# Patient Record
Sex: MALE | Race: OTHER | Employment: FULL TIME | URBAN - METROPOLITAN AREA
[De-identification: names, ages, dates, MRNs, and addresses within clinical notes are randomized per-mention and may not be internally consistent; named-entity substitution may affect disease eponyms.]

---

## 2017-01-06 ENCOUNTER — OFFICE VISIT (OUTPATIENT)
Dept: INTERNAL MEDICINE CLINIC | Facility: CLINIC | Age: 42
End: 2017-01-06

## 2017-01-06 VITALS
SYSTOLIC BLOOD PRESSURE: 140 MMHG | OXYGEN SATURATION: 98 % | WEIGHT: 216 LBS | HEIGHT: 72 IN | BODY MASS INDEX: 29.26 KG/M2 | HEART RATE: 80 BPM | TEMPERATURE: 98 F | DIASTOLIC BLOOD PRESSURE: 90 MMHG

## 2017-01-06 DIAGNOSIS — J01.10 ACUTE NON-RECURRENT FRONTAL SINUSITIS: Primary | ICD-10-CM

## 2017-01-06 DIAGNOSIS — I10 ESSENTIAL HYPERTENSION: ICD-10-CM

## 2017-01-06 DIAGNOSIS — R51.9 NONINTRACTABLE HEADACHE, UNSPECIFIED CHRONICITY PATTERN, UNSPECIFIED HEADACHE TYPE: ICD-10-CM

## 2017-01-06 DIAGNOSIS — E78.1 HYPERTRIGLYCERIDEMIA: ICD-10-CM

## 2017-01-06 PROCEDURE — 99214 OFFICE O/P EST MOD 30 MIN: CPT | Performed by: INTERNAL MEDICINE

## 2017-01-06 PROCEDURE — 99212 OFFICE O/P EST SF 10 MIN: CPT | Performed by: INTERNAL MEDICINE

## 2017-01-06 RX ORDER — CYCLOBENZAPRINE HCL 5 MG
5 TABLET ORAL 3 TIMES DAILY PRN
COMMUNITY
End: 2017-04-07

## 2017-01-06 RX ORDER — FLUTICASONE PROPIONATE 50 MCG
2 SPRAY, SUSPENSION (ML) NASAL DAILY
Qty: 3 BOTTLE | Refills: 3 | Status: SHIPPED | OUTPATIENT
Start: 2017-01-06 | End: 2018-01-01

## 2017-01-06 RX ORDER — AZITHROMYCIN 250 MG/1
TABLET, FILM COATED ORAL
Qty: 6 TABLET | Refills: 0 | Status: SHIPPED | OUTPATIENT
Start: 2017-01-06 | End: 2017-04-07 | Stop reason: ALTCHOICE

## 2017-01-06 NOTE — PROGRESS NOTES
Marce Ramos is a 40year old male. Patient presents with:  Checkup: 1 week f/u for headache, CT head negative, pt still has HA but it is not as bad  Nasal Congestion: Pt states since Monday he has had +nasal congestion--clear mucus, +sore throat, +nonp systemic lupus [Other] [OTHER] Mother       age 37   • Thyroid disease       sister, 2 brothers   • Heart Disease Brother 28     unknown heart disease   • Cancer Paternal Uncle      , lung, smoker   • Diabetes Paternal Aunt    • Cancer Maternal Aun 4. Essential hypertension  BP is a little elevated today but not sure as he is feeling very sick today. Encouraged to check BPs at home and call the office in 2 weeks with his bp readings.      5. Vertigo  Discussed cutting back on salt intake and incre

## 2017-01-25 RX ORDER — LEVOTHYROXINE SODIUM 0.07 MG/1
75 TABLET ORAL
Qty: 90 TABLET | Refills: 1 | Status: SHIPPED | OUTPATIENT
Start: 2017-01-25 | End: 2017-06-06

## 2017-01-25 NOTE — TELEPHONE ENCOUNTER
Called to make sure patient was still taking, we sent in April for #90, should have already received another request. Patient states he is still taking, but he is out of medication. eRx sent to [facility].

## 2017-04-07 ENCOUNTER — TELEPHONE (OUTPATIENT)
Dept: INTERNAL MEDICINE CLINIC | Facility: CLINIC | Age: 42
End: 2017-04-07

## 2017-04-07 NOTE — PROGRESS NOTES
Froylan Cade is a 40year old male. Patient presents with:  Dizziness: Pt states of pressure on both sides of head since Tuesday, dizziness, R eye blurred vision  Anxiety: Pt states he cannot stay at outside places long without having anxiety and then h • Diabetes Brother 27     type 2   • Hypertension Father    • systemic lupus [Other] [OTHER] Mother       age 37   • Thyroid disease       sister, 2 brothers   • Heart Disease Brother 28     unknown heart disease   • Cancer Paternal Uncle      , zyrtec. - Amoxicillin-Pot Clavulanate 875-125 MG Oral Tab; Take 1 tablet by mouth 2 (two) times daily. Dispense: 20 tablet;  Refill: 0      Johny Hollingsworth DO  4/7/2017  4:41 PM

## 2017-04-07 NOTE — TELEPHONE ENCOUNTER
Faxed Xanax refill #60 to [facility]  Pt was given written Rx for #30 in the office to fill at local pharm

## 2017-06-08 RX ORDER — LEVOTHYROXINE SODIUM 0.07 MG/1
TABLET ORAL
Qty: 90 TABLET | Refills: 1 | Status: SHIPPED | OUTPATIENT
Start: 2017-06-08 | End: 2018-04-23

## 2017-06-08 RX ORDER — ICOSAPENT ETHYL 1000 MG/1
CAPSULE ORAL
Qty: 180 CAPSULE | Refills: 1 | Status: SHIPPED | OUTPATIENT
Start: 2017-06-08 | End: 2019-12-16

## 2017-07-23 ENCOUNTER — TELEPHONE (OUTPATIENT)
Dept: INTERNAL MEDICINE CLINIC | Facility: CLINIC | Age: 42
End: 2017-07-23

## 2017-07-23 DIAGNOSIS — E78.1 HYPERTRIGLYCERIDEMIA: Primary | ICD-10-CM

## 2017-07-24 NOTE — TELEPHONE ENCOUNTER
Please notify patient that I reviewed his blood test results. A part of his cholesterol called triglcerides was high. Would recommend that he cut down on carbohydrates (bread, rice, cereal, pasta, desserts).  Also eating fish can help with this (or he can t

## 2017-07-24 NOTE — TELEPHONE ENCOUNTER
FBG gluc 98, uric acid 5.6, Cr 1.08, normal electrolytes, GFR 85, normal LFT, Iron 82, Tchol 156, , HDL 33, LDL 59, Hgb 13.8

## 2018-04-23 DIAGNOSIS — Z00.00 ANNUAL PHYSICAL EXAM: ICD-10-CM

## 2018-04-23 DIAGNOSIS — E03.9 HYPOTHYROIDISM, UNSPECIFIED TYPE: Primary | ICD-10-CM

## 2018-04-23 NOTE — TELEPHONE ENCOUNTER
Era Hutchins is requesting a refill on: Synthroid 75 mcg   Ph. # [phone]   Juan Carlos ph.  # [phone]   Routed to Rx

## 2018-04-27 RX ORDER — LEVOTHYROXINE SODIUM 0.07 MG/1
TABLET ORAL
Qty: 90 TABLET | Refills: 1 | Status: SHIPPED | OUTPATIENT
Start: 2018-04-27 | End: 2020-02-20

## 2018-04-27 NOTE — TELEPHONE ENCOUNTER
Please have patient come in for annual physical, has been >1 yr. Also placed blood work orders for fasting blood test. Try to get these done 1week prior to visit.

## 2018-04-30 NOTE — TELEPHONE ENCOUNTER
To  to call patient to schedule PE, Please relay MDs msg to have fasting blood work done prior to visit. Pt to fast for 12 hrs.

## 2018-06-11 ENCOUNTER — TELEPHONE (OUTPATIENT)
Dept: INTERNAL MEDICINE CLINIC | Facility: CLINIC | Age: 43
End: 2018-06-11

## 2018-06-11 NOTE — TELEPHONE ENCOUNTER
Pt had labs done about 2 weeks ago at [address] [place] [address] is looking for the results.     To Nursing

## 2018-06-11 NOTE — TELEPHONE ENCOUNTER
Called patient spoke with spouse and relayed DR. ROBERT message - also fax number for our office given - verbalized understanding

## 2018-06-11 NOTE — TELEPHONE ENCOUNTER
I did not receive any results--they should call [facility] to make sure it was faxed to the correct doctor.

## 2019-02-17 ENCOUNTER — HOSPITAL ENCOUNTER (EMERGENCY)
Facility: HOSPITAL | Age: 44
Discharge: HOME | End: 2019-02-17
Attending: EMERGENCY MEDICINE
Payer: COMMERCIAL

## 2019-02-17 ENCOUNTER — APPOINTMENT (OUTPATIENT)
Dept: CT IMAGING | Facility: HOSPITAL | Age: 44
End: 2019-02-17
Attending: EMERGENCY MEDICINE
Payer: COMMERCIAL

## 2019-02-17 VITALS
DIASTOLIC BLOOD PRESSURE: 78 MMHG | TEMPERATURE: 98 F | SYSTOLIC BLOOD PRESSURE: 125 MMHG | RESPIRATION RATE: 18 BRPM | BODY MASS INDEX: 28.44 KG/M2 | HEART RATE: 63 BPM | HEIGHT: 72 IN | OXYGEN SATURATION: 95 % | WEIGHT: 210 LBS

## 2019-02-17 DIAGNOSIS — A08.4 VIRAL GASTROENTERITIS: Primary | ICD-10-CM

## 2019-02-17 LAB
ANION GAP SERPL CALC-SCNC: 3 MMOL/L (ref 0–18)
BACTERIA UR QL AUTO: NEGATIVE /HPF
BASOPHILS # BLD AUTO: 0.05 X10(3) UL (ref 0–0.2)
BASOPHILS NFR BLD AUTO: 0.7 %
BILIRUB UR QL: NEGATIVE
BUN BLD-MCNC: 19 MG/DL (ref 7–18)
BUN/CREAT SERPL: 17 (ref 10–20)
CALCIUM BLD-MCNC: 8.8 MG/DL (ref 8.5–10.1)
CHLORIDE SERPL-SCNC: 106 MMOL/L (ref 98–107)
CLARITY UR: CLEAR
CO2 SERPL-SCNC: 29 MMOL/L (ref 21–32)
COLOR UR: YELLOW
CREAT BLD-MCNC: 1.12 MG/DL (ref 0.7–1.3)
DEPRECATED RDW RBC AUTO: 37.9 FL (ref 35.1–46.3)
EOSINOPHIL # BLD AUTO: 0.25 X10(3) UL (ref 0–0.7)
EOSINOPHIL NFR BLD AUTO: 3.3 %
ERYTHROCYTE [DISTWIDTH] IN BLOOD BY AUTOMATED COUNT: 11.4 % (ref 11–15)
GLUCOSE BLD-MCNC: 71 MG/DL (ref 70–99)
GLUCOSE UR-MCNC: NEGATIVE MG/DL
HCT VFR BLD AUTO: 41.7 % (ref 39–53)
HGB BLD-MCNC: 14.8 G/DL (ref 13–17.5)
HGB UR QL STRIP.AUTO: NEGATIVE
IMM GRANULOCYTES # BLD AUTO: 0.02 X10(3) UL (ref 0–1)
IMM GRANULOCYTES NFR BLD: 0.3 %
KETONES UR-MCNC: NEGATIVE MG/DL
LEUKOCYTE ESTERASE UR QL STRIP.AUTO: NEGATIVE
LYMPHOCYTES # BLD AUTO: 2.58 X10(3) UL (ref 1–4)
LYMPHOCYTES NFR BLD AUTO: 34.5 %
MCH RBC QN AUTO: 32.4 PG (ref 26–34)
MCHC RBC AUTO-ENTMCNC: 35.5 G/DL (ref 31–37)
MCV RBC AUTO: 91.2 FL (ref 80–100)
MONOCYTES # BLD AUTO: 0.54 X10(3) UL (ref 0.1–1)
MONOCYTES NFR BLD AUTO: 7.2 %
NEUTROPHILS # BLD AUTO: 4.04 X10 (3) UL (ref 1.5–7.7)
NEUTROPHILS # BLD AUTO: 4.04 X10(3) UL (ref 1.5–7.7)
NEUTROPHILS NFR BLD AUTO: 54 %
NITRITE UR QL STRIP.AUTO: NEGATIVE
OSMOLALITY SERPL CALC.SUM OF ELEC: 287 MOSM/KG (ref 275–295)
PH UR: 5 [PH] (ref 5–8)
PLATELET # BLD AUTO: 322 10(3)UL (ref 150–450)
POTASSIUM SERPL-SCNC: 3.9 MMOL/L (ref 3.5–5.1)
PROT UR-MCNC: NEGATIVE MG/DL
RBC # BLD AUTO: 4.57 X10(6)UL (ref 4.3–5.7)
RBC #/AREA URNS AUTO: 1 /HPF
SODIUM SERPL-SCNC: 138 MMOL/L (ref 136–145)
SP GR UR STRIP: 1.02 (ref 1–1.03)
UROBILINOGEN UR STRIP-ACNC: <2
VIT C UR-MCNC: NEGATIVE MG/DL
WBC # BLD AUTO: 7.5 X10(3) UL (ref 4–11)
WBC #/AREA URNS AUTO: 1 /HPF

## 2019-02-17 PROCEDURE — 85025 COMPLETE CBC W/AUTO DIFF WBC: CPT | Performed by: EMERGENCY MEDICINE

## 2019-02-17 PROCEDURE — 96374 THER/PROPH/DIAG INJ IV PUSH: CPT

## 2019-02-17 PROCEDURE — 96361 HYDRATE IV INFUSION ADD-ON: CPT

## 2019-02-17 PROCEDURE — 80048 BASIC METABOLIC PNL TOTAL CA: CPT | Performed by: EMERGENCY MEDICINE

## 2019-02-17 PROCEDURE — 74177 CT ABD & PELVIS W/CONTRAST: CPT | Performed by: EMERGENCY MEDICINE

## 2019-02-17 PROCEDURE — 81003 URINALYSIS AUTO W/O SCOPE: CPT | Performed by: EMERGENCY MEDICINE

## 2019-02-17 PROCEDURE — 99284 EMERGENCY DEPT VISIT MOD MDM: CPT

## 2019-02-17 RX ORDER — LOPERAMIDE HYDROCHLORIDE 2 MG/1
2 TABLET ORAL 4 TIMES DAILY PRN
Qty: 20 TABLET | Refills: 0 | Status: SHIPPED | OUTPATIENT
Start: 2019-02-17 | End: 2019-03-19

## 2019-02-17 RX ORDER — MORPHINE SULFATE 4 MG/ML
4 INJECTION, SOLUTION INTRAMUSCULAR; INTRAVENOUS ONCE
Status: COMPLETED | OUTPATIENT
Start: 2019-02-17 | End: 2019-02-17

## 2019-02-17 RX ORDER — MORPHINE SULFATE 4 MG/ML
4 INJECTION, SOLUTION INTRAMUSCULAR; INTRAVENOUS ONCE
Status: DISCONTINUED | OUTPATIENT
Start: 2019-02-17 | End: 2019-02-17

## 2019-02-17 NOTE — ED INITIAL ASSESSMENT (HPI)
Pt c/o LLQ pain x3 days, states pain present when having bm's most of the time. Also has pressure when urinating.

## 2019-02-17 NOTE — ED NOTES
Pt states abd pain is now worse than previously. Dr. Shalom Florence notified, order for iv morphine received and carried out.

## 2019-02-17 NOTE — ED PROVIDER NOTES
Patient Seen in: [facility] AND [facility] Emergency Department    History   Patient presents with:  Abdominal Pain    Stated Complaint: abd pain    HPI    46-year-old male with past medical history significant for high cholesterol, hypothyroidism, presents to t Mouth/Throat: MMM, post OP clear with no exudates  Eyes: Conjunctivae and EOM are normal. PERRLA  Neck: Normal range of motion. Neck supple. No tracheal deviation present.    CV: s1s2+, RRR, no m/r/g, normal distal pulses  Pulmonary/Chest: CTA b/l with no 6. Status post cholecystectomy. Mild extrahepatic biliary dilatation is within expectations for the post cholecystectomy state. 7. Uncomplicated duodenal diverticulosis. 8. Lesser incidental findings as above.    Dictated by (CST): Alida Quintero MD on

## 2019-02-19 ENCOUNTER — TELEPHONE (OUTPATIENT)
Dept: GASTROENTEROLOGY | Facility: CLINIC | Age: 44
End: 2019-02-19

## 2019-02-19 NOTE — TELEPHONE ENCOUNTER
Pt contacted and he accepted the following appt, directions provided to the [facility] MARIO, and told to arrive 15 mins earlier:  Future Appointments   Date Time Provider Yola Doty   2/27/2019  1:30 PM Radha August, DAVID ECWMOGASTRO [address] [facility]

## 2019-02-19 NOTE — TELEPHONE ENCOUNTER
New pt requesting appt with a GI physician in two days for ER follow up.  Please call thank you [phone]

## 2019-05-09 ENCOUNTER — OFFICE VISIT (OUTPATIENT)
Dept: GASTROENTEROLOGY | Facility: CLINIC | Age: 44
End: 2019-05-09
Payer: COMMERCIAL

## 2019-05-09 VITALS
SYSTOLIC BLOOD PRESSURE: 143 MMHG | WEIGHT: 217.19 LBS | HEART RATE: 64 BPM | HEIGHT: 72 IN | DIASTOLIC BLOOD PRESSURE: 91 MMHG | BODY MASS INDEX: 29.42 KG/M2

## 2019-05-09 DIAGNOSIS — R19.5 LOOSE STOOLS: ICD-10-CM

## 2019-05-09 DIAGNOSIS — R19.4 CHANGE IN BOWEL HABITS: ICD-10-CM

## 2019-05-09 DIAGNOSIS — R10.32 LLQ ABDOMINAL PAIN: Primary | ICD-10-CM

## 2019-05-09 DIAGNOSIS — R93.5 ABNORMAL CT SCAN, PELVIS: ICD-10-CM

## 2019-05-09 DIAGNOSIS — R93.5 ABNORMAL CT OF THE ABDOMEN: ICD-10-CM

## 2019-05-09 PROCEDURE — 99204 OFFICE O/P NEW MOD 45 MIN: CPT | Performed by: INTERNAL MEDICINE

## 2019-05-09 RX ORDER — DICYCLOMINE HYDROCHLORIDE 10 MG/1
10 CAPSULE ORAL 2 TIMES DAILY PRN
Qty: 60 CAPSULE | Refills: 0 | Status: SHIPPED | OUTPATIENT
Start: 2019-05-09 | End: 2019-07-23

## 2019-05-09 NOTE — PATIENT INSTRUCTIONS
1. Schedule colonoscopy with monitored anesthesia care (MAC) with Dr. Valentin Leong    2.  bowel prep from pharmacy (split Suprep )    3. Continue all medications for procedure    4. Read all bowel prep instructions carefully    5.  AVOID seeds, nuts, po

## 2019-05-09 NOTE — H&P
[facility] - Gastroenterology                                                                                                  Clinic History and Physical unknown heart disease   • Cancer Paternal Uncle         , lung, smoker   • Diabetes Paternal Aunt    • Cancer Maternal Aunt         breast, older age      Social History: Social History    Tobacco Use      Smoking status: Never Smoker      Smokeless to year old male with history of BMI 28, cholecystectomy 2006, hypothyroidism, GERD, hypertriglyceridemia, vertigo here regarding change in bowel habits and LLQ abdominal discomfort    Was in the ED in February for 3 days of abdominal cramping and diarrhea.  A

## 2019-05-10 ENCOUNTER — TELEPHONE (OUTPATIENT)
Dept: GASTROENTEROLOGY | Facility: CLINIC | Age: 44
End: 2019-05-10

## 2019-05-10 DIAGNOSIS — R10.32 LLQ ABDOMINAL PAIN: Primary | ICD-10-CM

## 2019-05-10 DIAGNOSIS — R19.4 CHANGE IN BOWEL HABITS: ICD-10-CM

## 2019-05-10 DIAGNOSIS — R19.5 LOOSE STOOLS: ICD-10-CM

## 2019-05-10 NOTE — TELEPHONE ENCOUNTER
Scheduled for:  Colonoscopy 99948  Provider Name:   Date:  6/5/19  Location:  [facility]  Sedation:  MAC  Time:  8am, pt told willl get call from [facility] for arrival time  Prep:suprep  Meds/Allergies Reconciled?:  maximus reviewed  Diagnosis with codes:  NICK a

## 2019-05-10 NOTE — TELEPHONE ENCOUNTER
PA for CLN initiated via [employer]. Clinicals faxed to [phone], fax confirmation received 5/10/19 @ [phone]. Case number:[id number].    -Awaiting insurance decision at this time.

## 2019-05-10 NOTE — TELEPHONE ENCOUNTER
GI RNs,    Pt has labor fund and is scheduled for colonoscopy on 6/5 at [facility] with  will pt need a PA?  Thank you

## 2019-05-14 ENCOUNTER — TELEPHONE (OUTPATIENT)
Dept: GASTROENTEROLOGY | Facility: CLINIC | Age: 44
End: 2019-05-14

## 2019-05-14 NOTE — TELEPHONE ENCOUNTER
Pts wife/Rani calling for pt cancelling CLN pito 6/5/19, requesting to debra for the date of 6/24/19, informed about the 72 hr cb, pls call at:[phone],thanks.

## 2019-05-20 NOTE — TELEPHONE ENCOUNTER
MELLISSA for [facility] at [phone] for PA for CLN updated. Case number:[id number].     -Awaiting insurance decision at this time.

## 2019-05-21 NOTE — TELEPHONE ENCOUNTER
Spoke to Pari Randolph from [facility] ([phone]) for PA update, she states the case has been approved as clinicals were received for DOS 6/5/2019 with approval number:[id number].

## 2019-05-21 NOTE — TELEPHONE ENCOUNTER
Spoke to Miranda Montiel from [facility] ([phone]) for PA for CLN updated. Case number:[id number]. She states they are awaiting clinicals. I reviewed they were already faxed on [email or web address]. She provided alternate fax of .  Clinicals faxed AGAIN to both

## 2019-05-22 NOTE — TELEPHONE ENCOUNTER
Spoke with pt's wife, she will CB once she speaks with him & they find a date that works for them to reschedule to. Please transfer to Arlette Pham at ext [phone] or [phone] for scheduling. Or please transfer to [facility] in GI if unavailable.

## 2019-05-29 NOTE — TELEPHONE ENCOUNTER
Scheduled for:  Colonoscopy 16713  Provider Name: Dr. Lucila Mann  Date:    From-6/5/19  To-7/17/19  Location:  [facility]  Sedation:  MAC  Time:    From-0800  To-0830 (pt is aware that [facility] will call the day before to confirm arrival time)    Prep:  Suprep, mailed

## 2019-07-15 ENCOUNTER — TELEPHONE (OUTPATIENT)
Dept: GASTROENTEROLOGY | Facility: CLINIC | Age: 44
End: 2019-07-15

## 2019-07-15 NOTE — TELEPHONE ENCOUNTER
Pts wife Joycelyn Chau called to cancel 7/17/19 colonoscopy. Pt will call back at later date to reschedule.

## 2019-07-15 NOTE — TELEPHONE ENCOUNTER
Cancelled for:  Colonoscopy 05302  Provider Name: Dr. Galdino Gtz  Date:    7/17/19  Location:  [facility]  Sedation:  MAC  Time:   0830   Prep:  Suprep  Meds/Allergies Reconciled?:  Physician reviewed   Diagnosis with codes:  LLQ abdominal pain R10.32, Change in b

## 2019-07-23 ENCOUNTER — OFFICE VISIT (OUTPATIENT)
Dept: FAMILY MEDICINE CLINIC | Facility: CLINIC | Age: 44
End: 2019-07-23
Payer: COMMERCIAL

## 2019-07-23 VITALS
WEIGHT: 216.63 LBS | HEIGHT: 72 IN | DIASTOLIC BLOOD PRESSURE: 78 MMHG | HEART RATE: 63 BPM | OXYGEN SATURATION: 98 % | SYSTOLIC BLOOD PRESSURE: 128 MMHG | TEMPERATURE: 98 F | BODY MASS INDEX: 29.34 KG/M2

## 2019-07-23 DIAGNOSIS — H60.12 CELLULITIS OF ANTIHELIX OF LEFT EAR: Primary | ICD-10-CM

## 2019-07-23 PROCEDURE — 99202 OFFICE O/P NEW SF 15 MIN: CPT | Performed by: PHYSICIAN ASSISTANT

## 2019-07-23 PROCEDURE — 87205 SMEAR GRAM STAIN: CPT | Performed by: PHYSICIAN ASSISTANT

## 2019-07-23 PROCEDURE — 87070 CULTURE OTHR SPECIMN AEROBIC: CPT | Performed by: PHYSICIAN ASSISTANT

## 2019-07-23 PROCEDURE — 87077 CULTURE AEROBIC IDENTIFY: CPT | Performed by: PHYSICIAN ASSISTANT

## 2019-07-23 PROCEDURE — 87186 SC STD MICRODIL/AGAR DIL: CPT | Performed by: PHYSICIAN ASSISTANT

## 2019-07-23 RX ORDER — SULFAMETHOXAZOLE AND TRIMETHOPRIM 800; 160 MG/1; MG/1
1 TABLET ORAL 2 TIMES DAILY
Qty: 14 TABLET | Refills: 0 | Status: SHIPPED | OUTPATIENT
Start: 2019-07-23 | End: 2019-07-30

## 2019-07-23 NOTE — PATIENT INSTRUCTIONS
· Use topical medication as prescribed  · Finish all oral antibiotics to prevent resistance  · Apply warm compress to infection site 3 times daily for 15 minutes as a time.   Be sure to use a clean wash rag each time  · Do not try to drain or \"pop\" lesion touches your skin should also wash his or her hands. Don't share towels. Follow-up care  Follow up with your healthcare provider, or as advised.  If your infection does not go away on the first antibiotic, your healthcare provider will prescribe a differen

## 2019-07-23 NOTE — PROGRESS NOTES
CHIEF COMPLAINT:   Patient presents with:  Ear Problem: L ear X 3 days, itchy, swollen, pain is radiating into jaw 6/10. headache      HPI:   Rowland Degree is a 38year old male who presents to clinic today with complaints of outer L ear pain.  Has had apparent distress  SKIN: no rashes,no suspicious lesions  HEAD: atraumatic, normocephalic  EYES: conjunctiva clear, EOM intact  EARS: Left tragus and auricle tender with manipulation . L antihelix edematous, erythematous extending to gisele and helix katja. topically 3 (three) times daily for 7 days. For 7 days         Risk and benefits of medication discussed. If antibiotics prescribed, stressed importance of completing full course of antibiotic. Acetaminophen or NSAID prn pain.       Follow up with PCP with cellulitis.   · If the infection is on your leg, keep your leg raised while sitting. This will help to reduce swelling. · Take all of the antibiotic medicine exactly as directed until it is gone.  Do not miss any doses, especially during the first 7 d

## 2019-07-26 ENCOUNTER — TELEPHONE (OUTPATIENT)
Dept: FAMILY MEDICINE CLINIC | Facility: CLINIC | Age: 44
End: 2019-07-26

## 2019-07-26 ENCOUNTER — HOSPITAL ENCOUNTER (OUTPATIENT)
Age: 44
Discharge: HOME | End: 2019-07-26
Attending: EMERGENCY MEDICINE
Payer: COMMERCIAL

## 2019-07-26 VITALS
WEIGHT: 217 LBS | SYSTOLIC BLOOD PRESSURE: 129 MMHG | HEART RATE: 76 BPM | HEIGHT: 72 IN | TEMPERATURE: 98 F | BODY MASS INDEX: 29.39 KG/M2 | OXYGEN SATURATION: 97 % | DIASTOLIC BLOOD PRESSURE: 85 MMHG | RESPIRATION RATE: 18 BRPM

## 2019-07-26 DIAGNOSIS — H60.12 CELLULITIS OF LEFT EXTERNAL EAR: Primary | ICD-10-CM

## 2019-07-26 PROCEDURE — 99214 OFFICE O/P EST MOD 30 MIN: CPT

## 2019-07-26 PROCEDURE — 99213 OFFICE O/P EST LOW 20 MIN: CPT

## 2019-07-26 RX ORDER — CLINDAMYCIN HYDROCHLORIDE 300 MG/1
300 CAPSULE ORAL 3 TIMES DAILY
Qty: 30 CAPSULE | Refills: 0 | Status: SHIPPED | OUTPATIENT
Start: 2019-07-26 | End: 2019-08-05

## 2019-07-26 NOTE — TELEPHONE ENCOUNTER
PT reports ear is worsening and currently on bactrim and bactroban. Pt reports pus now visible. Asked patient to follow up at our [address] today for further evaluation. Pt reported will leave from [address] within 30mins to go to the [address] [place] [address].  Culture is stil

## 2019-07-26 NOTE — ED INITIAL ASSESSMENT (HPI)
L earache since Sunday. Was seen at the walk in clinic on Monday and put on bactrim and given mupirocin ointment. Pt feels like sx have gotten worse.

## 2019-07-26 NOTE — ED PROVIDER NOTES
Patient Seen in: [facility] AND CLINICS Immediate Care In [address] [place] [address]    History   Patient presents with:  Ear Problem Pain (neurosensory)    Stated Complaint: left ear pain/pressure    HPI    Patient with  URI symptoms for 3-4 days,no  fever, runny nose and lef reviewed and is not pertinent to presenting problem. Social History    Tobacco Use      Smoking status: Never Smoker      Smokeless tobacco: Never Used    Alcohol use:  Yes      Alcohol/week: 1.0 standard drinks      Types: 1 Standard drinks or equivalent HCl 300 MG Oral Cap  Take 1 capsule (300 mg total) by mouth 3 (three) times daily for 10 days.   Qty: 30 capsule Refills: 0

## 2019-07-27 ENCOUNTER — TELEPHONE (OUTPATIENT)
Dept: FAMILY MEDICINE CLINIC | Facility: CLINIC | Age: 44
End: 2019-07-27

## 2019-07-27 NOTE — TELEPHONE ENCOUNTER
TC to pt-informed him of lab results. He was recently seen in [address] [place] [address] for lack of improvement for L ear cellulitis and started on clindamycin and bactrim was discontinued. Discussed with pt likely resistance to clindamycin based on susceptibility report.  He rep

## 2019-09-02 ENCOUNTER — TELEPHONE (OUTPATIENT)
Dept: INTERNAL MEDICINE CLINIC | Facility: CLINIC | Age: 44
End: 2019-09-02

## 2019-09-02 NOTE — TELEPHONE ENCOUNTER
Please notify patient that I received his lab results; but have not seen him for a physical in a while-would recommend that we do this also.      In terms of his lab results-fasting sugar, kidney function, liver function, blood counts were all normal. His t

## 2019-09-03 NOTE — TELEPHONE ENCOUNTER
Spoke to pt's spouse regarding MD message and lab results (ok per hippa). Spouse verbalized understanding and reports she will have pt call back to schedule OV as she does not know his schedule.

## 2019-11-06 ENCOUNTER — TELEPHONE (OUTPATIENT)
Dept: GASTROENTEROLOGY | Facility: CLINIC | Age: 44
End: 2019-11-06

## 2019-11-06 DIAGNOSIS — R19.4 CHANGE IN BOWEL HABITS: ICD-10-CM

## 2019-11-06 DIAGNOSIS — R19.5 LOOSE STOOLS: ICD-10-CM

## 2019-11-06 DIAGNOSIS — R10.32 LLQ ABDOMINAL PAIN: Primary | ICD-10-CM

## 2019-11-06 NOTE — TELEPHONE ENCOUNTER
Wife (on HIPAA) states patient was scheduled for a colonoscopy on 07/17/19 but had to cancel. Patient is currently experiencing  abdominal cramping and diarrhea (4 to 5 times daily) for 2 days. Feeling better today.   Denies abdominal pain,nausea,vomiting,

## 2019-11-06 NOTE — TELEPHONE ENCOUNTER
Ok to schedule    Will need suprep    Thanks    Nikky Solomon MD  [facility], [facility] - Gastroenterology  11/6/2019  1:46 PM

## 2019-11-06 NOTE — TELEPHONE ENCOUNTER
I spoke with patient's wife and message from Dr. Bibi Dow given. Please assist patient in rescheduling his colonoscopy. See message below from Dr. Bibi Dow. Thank you.

## 2019-11-06 NOTE — TELEPHONE ENCOUNTER
Wife states pt had diarrhea and stomach pains three days ago. Wife states pt is feeling better and inquiring if CLN can be scheduled at this time.  Please call [phone]

## 2019-11-13 NOTE — TELEPHONE ENCOUNTER
Scheduled for:  Colonoscopy 25205  Provider Name: Dr. Valentin Leong  Date:    11/25/19  [facility]  GONZALEZ:   1820 (pt is aware to arrive at 0700)    Prep:  Suprep, mailed 11/14/19   Meds/Allergies Reconciled?:  Physician reviewed   Diagnosis

## 2019-11-25 ENCOUNTER — HOSPITAL ENCOUNTER (OUTPATIENT)
Facility: HOSPITAL | Age: 44
Setting detail: HOSPITAL OUTPATIENT SURGERY
Discharge: HOME | End: 2019-11-25
Attending: INTERNAL MEDICINE | Admitting: INTERNAL MEDICINE
Payer: COMMERCIAL

## 2019-11-25 ENCOUNTER — ANESTHESIA (OUTPATIENT)
Dept: ENDOSCOPY | Facility: HOSPITAL | Age: 44
End: 2019-11-25
Payer: COMMERCIAL

## 2019-11-25 ENCOUNTER — ANESTHESIA EVENT (OUTPATIENT)
Dept: ENDOSCOPY | Facility: HOSPITAL | Age: 44
End: 2019-11-25
Payer: COMMERCIAL

## 2019-11-25 VITALS
RESPIRATION RATE: 15 BRPM | WEIGHT: 212 LBS | BODY MASS INDEX: 28.1 KG/M2 | OXYGEN SATURATION: 95 % | HEART RATE: 62 BPM | DIASTOLIC BLOOD PRESSURE: 70 MMHG | HEIGHT: 73 IN | SYSTOLIC BLOOD PRESSURE: 113 MMHG

## 2019-11-25 DIAGNOSIS — R19.5 LOOSE STOOLS: ICD-10-CM

## 2019-11-25 DIAGNOSIS — R19.4 CHANGE IN BOWEL HABITS: ICD-10-CM

## 2019-11-25 DIAGNOSIS — R10.32 LLQ ABDOMINAL PAIN: ICD-10-CM

## 2019-11-25 PROCEDURE — 45380 COLONOSCOPY AND BIOPSY: CPT | Performed by: INTERNAL MEDICINE

## 2019-11-25 PROCEDURE — 0DBE8ZX EXCISION OF LARGE INTESTINE, VIA NATURAL OR ARTIFICIAL OPENING ENDOSCOPIC, DIAGNOSTIC: ICD-10-PCS | Performed by: INTERNAL MEDICINE

## 2019-11-25 RX ORDER — SODIUM CHLORIDE, SODIUM LACTATE, POTASSIUM CHLORIDE, CALCIUM CHLORIDE 600; 310; 30; 20 MG/100ML; MG/100ML; MG/100ML; MG/100ML
INJECTION, SOLUTION INTRAVENOUS CONTINUOUS
Status: DISCONTINUED | OUTPATIENT
Start: 2019-11-25 | End: 2019-11-25

## 2019-11-25 RX ORDER — LIDOCAINE HYDROCHLORIDE 10 MG/ML
INJECTION, SOLUTION EPIDURAL; INFILTRATION; INTRACAUDAL; PERINEURAL AS NEEDED
Status: DISCONTINUED | OUTPATIENT
Start: 2019-11-25 | End: 2019-11-25 | Stop reason: SURG

## 2019-11-25 RX ORDER — MIDAZOLAM HYDROCHLORIDE 1 MG/ML
INJECTION INTRAMUSCULAR; INTRAVENOUS AS NEEDED
Status: DISCONTINUED | OUTPATIENT
Start: 2019-11-25 | End: 2019-11-25 | Stop reason: SURG

## 2019-11-25 RX ORDER — NALOXONE HYDROCHLORIDE 0.4 MG/ML
80 INJECTION, SOLUTION INTRAMUSCULAR; INTRAVENOUS; SUBCUTANEOUS AS NEEDED
Status: DISCONTINUED | OUTPATIENT
Start: 2019-11-25 | End: 2019-11-25

## 2019-11-25 RX ADMIN — SODIUM CHLORIDE, SODIUM LACTATE, POTASSIUM CHLORIDE, CALCIUM CHLORIDE: 600; 310; 30; 20 INJECTION, SOLUTION INTRAVENOUS at 07:59:00

## 2019-11-25 RX ADMIN — MIDAZOLAM HYDROCHLORIDE 2 MG: 1 INJECTION INTRAMUSCULAR; INTRAVENOUS at 08:01:00

## 2019-11-25 RX ADMIN — LIDOCAINE HYDROCHLORIDE 20 MG: 10 INJECTION, SOLUTION EPIDURAL; INFILTRATION; INTRACAUDAL; PERINEURAL at 08:01:00

## 2019-11-25 RX ADMIN — SODIUM CHLORIDE, SODIUM LACTATE, POTASSIUM CHLORIDE, CALCIUM CHLORIDE: 600; 310; 30; 20 INJECTION, SOLUTION INTRAVENOUS at 08:20:00

## 2019-11-25 NOTE — ANESTHESIA PREPROCEDURE EVALUATION
Anesthesia PreOp Note    HPI:     Madi Kidd is a 38year old male who presents for preoperative consultation requested by: Willis Ontiveros MD    Date of Surgery: 11/25/2019    Procedure(s):  COLONOSCOPY  Indication: LLQ abdominal pain, Change in • Thyroid disease Other         sister, 2 brothers   • Heart Disease Brother 28        unknown heart disease   • Cancer Paternal Uncle         , lung, smoker   • Diabetes Paternal Aunt    • Cancer Maternal Aunt         breast, older age     Social [address] [facility] [place] Stress Concern: Not Asked        Weight Concern: Not Asked        Special Diet: Not Asked        Back Care: Not Asked        Exercise: Not Asked        Bike Helmet: Not Asked        Seat Belt: Not Asked        Self-Exams: Not Asked    Social History

## 2019-11-25 NOTE — OPERATIVE REPORT
Colonoscopy Report    Bernardo Fairbanks     1975 Age 38year old   PCP Warner Conner MD Endoscopist Elizabeth Menon MD     Date of procedure: 19    Procedure: Colonoscopy w/ biopsy    Pre-operative diagnosis: LLQ abdominal pain, change in complications. The patient’s vital signs were monitored throughout the procedure and remained stable.     Estimated blood loss: insignificant    Specimens collected:  Colon polyp    Complications: none     Colonoscopy findings:  Terminal ileum: normal mucos

## 2019-11-25 NOTE — H&P
History & Physical Examination    Patient Name: [place] Degree  MRN: [id number]  [facility]: [id number]  YOB: 1975    Diagnosis: change in bowel habits, LLQ abdominal discomfort    Levothyroxine Sodium 75 MCG Oral Tab, Take 1 tablet by mouth  before of the procedure with the patient/family. They understand and agree to proceed with plan of care. I have reviewed the History and Physical done within the last 30 days. Any changes noted above.   Charity Chen MD  [facility], [facility] - Gastroenterology

## 2019-11-25 NOTE — ANESTHESIA POSTPROCEDURE EVALUATION
Patient: Ave Solomon    Procedure Summary     Date:  11/25/19 Room / Location:  [address] [place] [address] ENDOSCOPY 01 / [address] [place] [address] ENDOSCOPY    Anesthesia Start:  2788 Anesthesia Stop:  5364    Procedure:  COLONOSCOPY (N/A ) Diagnosis:       LLQ abdominal pain      Change in rey

## 2019-11-27 ENCOUNTER — TELEPHONE (OUTPATIENT)
Dept: GASTROENTEROLOGY | Facility: CLINIC | Age: 44
End: 2019-11-27

## 2019-11-27 NOTE — TELEPHONE ENCOUNTER
I mailed out colonoscopy results letter to pt  Updated health maintenance  Entered into 5 yr CLN recall  Recall colon in 5 years per.  Colon done 11/25/19    GI staff: please place recall for colonoscopy in 5 years

## 2019-12-16 ENCOUNTER — OFFICE VISIT (OUTPATIENT)
Dept: INTERNAL MEDICINE CLINIC | Facility: CLINIC | Age: 44
End: 2019-12-16
Payer: COMMERCIAL

## 2019-12-16 VITALS
WEIGHT: 209 LBS | TEMPERATURE: 98 F | BODY MASS INDEX: 27.7 KG/M2 | OXYGEN SATURATION: 98 % | HEIGHT: 73 IN | DIASTOLIC BLOOD PRESSURE: 78 MMHG | RESPIRATION RATE: 16 BRPM | HEART RATE: 74 BPM | SYSTOLIC BLOOD PRESSURE: 126 MMHG

## 2019-12-16 DIAGNOSIS — M25.512 ACUTE PAIN OF BOTH SHOULDERS: ICD-10-CM

## 2019-12-16 DIAGNOSIS — E03.9 HYPOTHYROIDISM, UNSPECIFIED TYPE: ICD-10-CM

## 2019-12-16 DIAGNOSIS — M25.511 ACUTE PAIN OF BOTH SHOULDERS: ICD-10-CM

## 2019-12-16 DIAGNOSIS — Z00.00 ANNUAL PHYSICAL EXAM: Primary | ICD-10-CM

## 2019-12-16 DIAGNOSIS — E78.1 HYPERTRIGLYCERIDEMIA: ICD-10-CM

## 2019-12-16 DIAGNOSIS — K63.5 POLYP OF COLON, UNSPECIFIED PART OF COLON, UNSPECIFIED TYPE: ICD-10-CM

## 2019-12-16 DIAGNOSIS — F41.9 ANXIETY: ICD-10-CM

## 2019-12-16 PROCEDURE — 99396 PREV VISIT EST AGE 40-64: CPT | Performed by: INTERNAL MEDICINE

## 2019-12-16 RX ORDER — MELOXICAM 7.5 MG/1
7.5 TABLET ORAL DAILY
Qty: 30 TABLET | Refills: 1 | Status: SHIPPED | OUTPATIENT
Start: 2019-12-16 | End: 2020-10-28 | Stop reason: ALTCHOICE

## 2019-12-16 RX ORDER — CLONAZEPAM 0.5 MG/1
TABLET ORAL
Refills: 0 | COMMUNITY
Start: 2019-07-30 | End: 2019-12-16

## 2019-12-16 RX ORDER — ROSUVASTATIN CALCIUM 10 MG/1
TABLET, COATED ORAL
Refills: 1 | COMMUNITY
Start: 2019-10-29 | End: 2020-04-20

## 2019-12-16 RX ORDER — BROMPHENIRAMINE MALEATE, PSEUDOEPHEDRINE HYDROCHLORIDE, AND DEXTROMETHORPHAN HYDROBROMIDE 2; 30; 10 MG/5ML; MG/5ML; MG/5ML
SYRUP ORAL
Refills: 0 | COMMUNITY
Start: 2019-09-10 | End: 2019-12-16

## 2019-12-16 RX ORDER — ALPRAZOLAM 0.25 MG/1
0.25 TABLET ORAL DAILY PRN
Qty: 30 TABLET | Refills: 0 | Status: SHIPPED | OUTPATIENT
Start: 2019-12-16 | End: 2020-04-19

## 2019-12-16 RX ORDER — ICOSAPENT ETHYL 1000 MG/1
4 CAPSULE ORAL DAILY
Qty: 360 CAPSULE | Refills: 1 | Status: SHIPPED | OUTPATIENT
Start: 2019-12-16 | End: 2020-03-15

## 2019-12-16 NOTE — PROGRESS NOTES
Jay Cohen is a 38year old malePatient presents with:  Neck Pain: Patient c/o left neck and shoulder pain for past 1 week. Pain 8/10 to left neck and shoulder. Lab Results: Patient present to discuss lab work that were faxed a few months ago. Surgical History:   Procedure Laterality Date   • CHOLECYSTECTOMY  2006   • COLONOSCOPY N/A 11/25/2019    Performed by Deejay Babin MD at [address]  around 2004 per patient   • REMOVAL GALLBLADDER        F b/l    ASSESSMENT AND PLAN:     1. dyslipidemia  Triglycerides >400; pt was taking vascepa 2g daily; will increase to 4g daily. Given written orders for labs in March 2020.      2. Hypothyroidism, unspecified type  Synthroid 75mcg daily; check TSH (given wr

## 2019-12-17 ENCOUNTER — TELEPHONE (OUTPATIENT)
Dept: INTERNAL MEDICINE CLINIC | Facility: CLINIC | Age: 44
End: 2019-12-17

## 2019-12-17 NOTE — TELEPHONE ENCOUNTER
Galindo Soria is requesting clarification of directions for Vascepa,  Taking 4 ts po once daily? ?  Placed in purple folder

## 2019-12-18 ENCOUNTER — PATIENT MESSAGE (OUTPATIENT)
Dept: INTERNAL MEDICINE CLINIC | Facility: CLINIC | Age: 44
End: 2019-12-18

## 2019-12-19 NOTE — TELEPHONE ENCOUNTER
From: Bernardo Fairbanks  To: Kathleen Finley DO  Sent: 12/18/2019 5:32 PM CST  Subject: Prescription Question    How many a day should I take? the vascepa was too expensive.

## 2019-12-27 ENCOUNTER — LAB ENCOUNTER (OUTPATIENT)
Dept: LAB | Age: 44
End: 2019-12-27
Attending: INTERNAL MEDICINE
Payer: COMMERCIAL

## 2019-12-27 DIAGNOSIS — Z12.5 PROSTATE CANCER SCREENING: ICD-10-CM

## 2019-12-27 DIAGNOSIS — E03.9 HYPOTHYROIDISM: Primary | ICD-10-CM

## 2019-12-27 PROCEDURE — 84443 ASSAY THYROID STIM HORMONE: CPT

## 2019-12-27 PROCEDURE — 36415 COLL VENOUS BLD VENIPUNCTURE: CPT

## 2019-12-27 PROCEDURE — 84439 ASSAY OF FREE THYROXINE: CPT

## 2020-01-01 ENCOUNTER — PATIENT MESSAGE (OUTPATIENT)
Dept: INTERNAL MEDICINE CLINIC | Facility: CLINIC | Age: 45
End: 2020-01-01

## 2020-01-01 DIAGNOSIS — E03.9 HYPOTHYROIDISM, UNSPECIFIED TYPE: Primary | ICD-10-CM

## 2020-01-02 NOTE — TELEPHONE ENCOUNTER
From: Suzanne Leo  To: Sergio Bhagat DO  Sent: 1/1/2020 8:32 PM CST  Subject: Test Results Question    I have a question about PSA SCREEN resulted on 12/27/19, 7:35 PM.    Was all my test results ok?

## 2020-01-02 NOTE — TELEPHONE ENCOUNTER
Called patient spoke with spouse per hipaa and relayed DR. ROBERT message - verbalized understanding and will relay message to

## 2020-01-02 NOTE — TELEPHONE ENCOUNTER
Please notify patient that his prostate tests was ok; one out of the two thyroid tests was a little off--if he is taking any biotin supplements, this can interfere with the test and he should stop taking them.     I would like him to repeat the thyroid test

## 2020-02-17 ENCOUNTER — LAB ENCOUNTER (OUTPATIENT)
Dept: LAB | Age: 45
End: 2020-02-17
Attending: INTERNAL MEDICINE
Payer: COMMERCIAL

## 2020-02-17 DIAGNOSIS — E03.9 HYPOTHYROIDISM, UNSPECIFIED TYPE: ICD-10-CM

## 2020-02-17 LAB
T3FREE SERPL-MCNC: 6.07 PG/ML (ref 2.4–4.2)
T4 FREE SERPL-MCNC: 1.7 NG/DL (ref 0.8–1.7)
TSI SER-ACNC: <0.005 MIU/ML (ref 0.36–3.74)

## 2020-02-17 PROCEDURE — 84439 ASSAY OF FREE THYROXINE: CPT

## 2020-02-17 PROCEDURE — 84443 ASSAY THYROID STIM HORMONE: CPT

## 2020-02-17 PROCEDURE — 84481 FREE ASSAY (FT-3): CPT

## 2020-02-17 PROCEDURE — 36415 COLL VENOUS BLD VENIPUNCTURE: CPT

## 2020-02-18 ENCOUNTER — TELEPHONE (OUTPATIENT)
Dept: INTERNAL MEDICINE CLINIC | Facility: CLINIC | Age: 45
End: 2020-02-18

## 2020-02-18 DIAGNOSIS — E03.9 HYPOTHYROIDISM, UNSPECIFIED TYPE: Primary | ICD-10-CM

## 2020-02-18 NOTE — TELEPHONE ENCOUNTER
Routed to Dr. Radha Rodriguez; Labs performed 2/17 (OK Per [facility] Verbal Release to speak with Jc Ramos / Spouse)

## 2020-02-20 RX ORDER — LEVOTHYROXINE SODIUM 0.05 MG/1
50 TABLET ORAL
Qty: 30 TABLET | Refills: 1 | Status: SHIPPED | OUTPATIENT
Start: 2020-02-20 | End: 2020-04-20

## 2020-02-20 NOTE — TELEPHONE ENCOUNTER
Discussed with Dr. Yury Baptiste this morning,  She will order I-123 scan. Ok to schedule in a few weeks post scan. Thanks.

## 2020-02-20 NOTE — TELEPHONE ENCOUNTER
Spoke with patient---my error in interpreting the results. No need for scan or endocrinology evaluation at this time. Will cut down his thyroid medication down to 50mcg and recheck in 4 weeks. Rick Alvarez agrees to plan.

## 2020-02-20 NOTE — TELEPHONE ENCOUNTER
Please call patient's wife--I spoke with endocrinologist, advised that patient have a thyroid scan (if possible before seeing the endocrinologist). I put this order in the system; he should call to get this scheduled.

## 2020-02-20 NOTE — TELEPHONE ENCOUNTER
Discussed with patient's wife--concern for hyperthyroidism, although t4 is normal, but ft3 is elevated. She states that patient does have intermittent tachycardia, loose stools. Advised to see endocrinology for evaluation.      Routed to Dr. Nancy Pleitez

## 2020-02-20 NOTE — TELEPHONE ENCOUNTER
Geovanny Lozano called again, they called to schedule test and was told pt needs to be off the thyroid medication for 4-6 weeks prior to test.  Is that ok or is there another test that Dr Maria C Kearney would like pt to have?   Tasked to nursing

## 2020-02-20 NOTE — TELEPHONE ENCOUNTER
Spoke to Amie and relayed Dr Dulce Maria Sim message to her. She verbalized understanding. I also gave her phone # for central scheduling.   I also sent a [employer] message with the information about the order, including cpt code, since she wants to check if test c

## 2020-03-13 ENCOUNTER — TELEPHONE (OUTPATIENT)
Dept: INTERNAL MEDICINE CLINIC | Facility: CLINIC | Age: 45
End: 2020-03-13

## 2020-03-13 RX ORDER — FLUTICASONE PROPIONATE 50 MCG
2 SPRAY, SUSPENSION (ML) NASAL DAILY
Qty: 1 BOTTLE | Refills: 0 | Status: SHIPPED | OUTPATIENT
Start: 2020-03-13 | End: 2020-10-28 | Stop reason: ALTCHOICE

## 2020-03-13 NOTE — TELEPHONE ENCOUNTER
LMTCB on cell--    Called home- spoke to wife Francisca Drummond (OK per per HIPAA) and relayed MD message. Pt's wife requests Flonase to be Rx'ed to [facility] - Rx sent. Pt's wife requests 3:30PM appointment to be cancelled-done.

## 2020-03-13 NOTE — TELEPHONE ENCOUNTER
Sounds like a cold--he can try flonase nasal spray 2 sprays each nostril daily, zyrtec daily, and sudafed for 2-3 days to see if this help. Other things would be sleeping propped up for a few days.      I can still see him today if he likes, or he can try

## 2020-03-13 NOTE — TELEPHONE ENCOUNTER
Pt scheduled MyChart appointment today for 3:30PM for \"congestion, runny running nose, sneezing, water eyes last 5 days\". Dr. Joby Dior please advise- Pt reports having runny nose, sinus pressure behind nose and eyes for the past five days.  Pt reports ba

## 2020-04-10 ENCOUNTER — PATIENT MESSAGE (OUTPATIENT)
Dept: INTERNAL MEDICINE CLINIC | Facility: CLINIC | Age: 45
End: 2020-04-10

## 2020-04-13 RX ORDER — FLUTICASONE PROPIONATE 50 MCG
SPRAY, SUSPENSION (ML) NASAL
Qty: 16 G | Refills: 0 | OUTPATIENT
Start: 2020-04-13

## 2020-04-13 NOTE — TELEPHONE ENCOUNTER
From: Joan Cornelius  To: Supa Ott DO  Sent: 4/10/2020 6:57 PM CDT  Subject: Other    Hi I received a message stating if I requested a refill for Flonase. I did not request a refill. I'm doing ok.

## 2020-04-19 ENCOUNTER — PATIENT MESSAGE (OUTPATIENT)
Dept: INTERNAL MEDICINE CLINIC | Facility: CLINIC | Age: 45
End: 2020-04-19

## 2020-04-19 DIAGNOSIS — E03.9 HYPOTHYROIDISM, UNSPECIFIED TYPE: ICD-10-CM

## 2020-04-19 DIAGNOSIS — Z00.00 ANNUAL PHYSICAL EXAM: Primary | ICD-10-CM

## 2020-04-19 DIAGNOSIS — F41.9 ANXIETY: ICD-10-CM

## 2020-04-19 RX ORDER — LEVOTHYROXINE SODIUM 0.05 MG/1
50 TABLET ORAL
Qty: 30 TABLET | Refills: 1 | Status: CANCELLED | OUTPATIENT
Start: 2020-04-19

## 2020-04-20 RX ORDER — LEVOTHYROXINE SODIUM 0.05 MG/1
50 TABLET ORAL
Qty: 30 TABLET | Refills: 1 | Status: SHIPPED | OUTPATIENT
Start: 2020-04-20 | End: 2020-06-01

## 2020-04-20 RX ORDER — ALPRAZOLAM 0.25 MG/1
0.25 TABLET ORAL DAILY PRN
Qty: 30 TABLET | Refills: 0 | Status: SHIPPED | OUTPATIENT
Start: 2020-04-20 | End: 2020-08-18

## 2020-04-20 RX ORDER — LEVOTHYROXINE SODIUM 0.05 MG/1
TABLET ORAL
Qty: 30 TABLET | Refills: 1 | OUTPATIENT
Start: 2020-04-20

## 2020-04-20 RX ORDER — ROSUVASTATIN CALCIUM 10 MG/1
10 TABLET, COATED ORAL DAILY
Qty: 90 TABLET | Refills: 1 | Status: SHIPPED | OUTPATIENT
Start: 2020-04-20 | End: 2021-06-01

## 2020-04-20 NOTE — TELEPHONE ENCOUNTER
To Dr. Eyal Daly----    The above refill request is for a controlled substance. Please review pended medication order.

## 2020-04-20 NOTE — TELEPHONE ENCOUNTER
From: Michelle Aguirre  To: Chase Knowles DO  Sent: 4/19/2020 3:09 PM CDT  Subject: Non-Urgent Medical Question    I also need a refill for this medication you said to continue taking it. It was a prescription that my old doctor prescribed me.  Also is it safe

## 2020-04-20 NOTE — TELEPHONE ENCOUNTER
To Dr. Chelsea Garcia----    Pended for review. Thank you. Pt also has mychart question (aware he needs to complete labs) will forward to you.

## 2020-04-21 ENCOUNTER — TELEPHONE (OUTPATIENT)
Dept: INTERNAL MEDICINE CLINIC | Facility: CLINIC | Age: 45
End: 2020-04-21

## 2020-04-29 ENCOUNTER — APPOINTMENT (OUTPATIENT)
Dept: GENERAL RADIOLOGY | Facility: HOSPITAL | Age: 45
End: 2020-04-29
Attending: EMERGENCY MEDICINE
Payer: COMMERCIAL

## 2020-04-29 ENCOUNTER — PATIENT MESSAGE (OUTPATIENT)
Dept: INTERNAL MEDICINE CLINIC | Facility: CLINIC | Age: 45
End: 2020-04-29

## 2020-04-29 ENCOUNTER — HOSPITAL ENCOUNTER (EMERGENCY)
Facility: HOSPITAL | Age: 45
Discharge: HOME | End: 2020-04-29
Attending: EMERGENCY MEDICINE
Payer: COMMERCIAL

## 2020-04-29 ENCOUNTER — TELEPHONE (OUTPATIENT)
Dept: INTERNAL MEDICINE CLINIC | Facility: CLINIC | Age: 45
End: 2020-04-29

## 2020-04-29 VITALS
DIASTOLIC BLOOD PRESSURE: 76 MMHG | TEMPERATURE: 98 F | SYSTOLIC BLOOD PRESSURE: 140 MMHG | HEART RATE: 61 BPM | OXYGEN SATURATION: 97 % | BODY MASS INDEX: 28.04 KG/M2 | HEIGHT: 72 IN | RESPIRATION RATE: 17 BRPM | WEIGHT: 207 LBS

## 2020-04-29 DIAGNOSIS — R00.2 PALPITATIONS: Primary | ICD-10-CM

## 2020-04-29 DIAGNOSIS — E78.1 HYPERTRIGLYCERIDEMIA: ICD-10-CM

## 2020-04-29 DIAGNOSIS — E03.9 HYPOTHYROIDISM, UNSPECIFIED TYPE: Primary | ICD-10-CM

## 2020-04-29 PROCEDURE — 84481 FREE ASSAY (FT-3): CPT | Performed by: EMERGENCY MEDICINE

## 2020-04-29 PROCEDURE — 93010 ELECTROCARDIOGRAM REPORT: CPT | Performed by: EMERGENCY MEDICINE

## 2020-04-29 PROCEDURE — 71045 X-RAY EXAM CHEST 1 VIEW: CPT | Performed by: EMERGENCY MEDICINE

## 2020-04-29 PROCEDURE — 84484 ASSAY OF TROPONIN QUANT: CPT | Performed by: EMERGENCY MEDICINE

## 2020-04-29 PROCEDURE — 85025 COMPLETE CBC W/AUTO DIFF WBC: CPT | Performed by: EMERGENCY MEDICINE

## 2020-04-29 PROCEDURE — 84439 ASSAY OF FREE THYROXINE: CPT | Performed by: EMERGENCY MEDICINE

## 2020-04-29 PROCEDURE — 84443 ASSAY THYROID STIM HORMONE: CPT | Performed by: EMERGENCY MEDICINE

## 2020-04-29 PROCEDURE — 96374 THER/PROPH/DIAG INJ IV PUSH: CPT

## 2020-04-29 PROCEDURE — 93005 ELECTROCARDIOGRAM TRACING: CPT

## 2020-04-29 PROCEDURE — 80048 BASIC METABOLIC PNL TOTAL CA: CPT | Performed by: EMERGENCY MEDICINE

## 2020-04-29 PROCEDURE — 99285 EMERGENCY DEPT VISIT HI MDM: CPT

## 2020-04-29 RX ORDER — CLONAZEPAM 0.5 MG/1
0.5 TABLET ORAL 2 TIMES DAILY PRN
Qty: 6 TABLET | Refills: 0 | Status: SHIPPED | OUTPATIENT
Start: 2020-04-29 | End: 2020-05-02

## 2020-04-29 RX ORDER — LORAZEPAM 2 MG/ML
0.5 INJECTION INTRAMUSCULAR ONCE
Status: COMPLETED | OUTPATIENT
Start: 2020-04-29 | End: 2020-04-29

## 2020-04-29 NOTE — TELEPHONE ENCOUNTER
BOZENA to Dr. Henry Orantes----    mycraudelt received with the following:  \"Good afternoon I have not been feeling that well for the past 3 days  my blood pressure has been mild. I been having sweats at night and feel my pulse from my heart not pulsing right.  Sometimes fe

## 2020-04-29 NOTE — ED PROVIDER NOTES
Patient Seen in: [facility] AND [facility] Emergency Department    History   Patient presents with:  Hypertension  Arrythmia/Palpitations    Stated Complaint: high bp, palpitations     HPI    43-year-old male with past medical history of dyslipidemia, hypothyroid  age 38   • Diabetes Brother 27        type 2   • Hypertension Father    • Thyroid disease Other         sister, 2 brothers   • Heart Disease Brother 28        unknown heart disease   • Cancer Paternal Uncle         , lung, smoker   • Diabet within normal limits   FREE T3 (TRIIODOTHYRONINE) - Abnormal; Notable for the following components:    T3 Free 4.41 (*)     All other components within normal limits   BASIC METABOLIC PANEL (8) - Normal   TROPONIN I - Normal   T4, FREE (S) - Normal   CBC W Suspicious Hx Score:  0   EKG:  Normal EKG Score:  0   HTN:  No   Hypercholesterolemia:  Yes   Atherosclerosis/PVD:  No   DM:  No   BMI>30kg/m2:  No   Smoking:  No   Family History:  No         Other Risk Factor Score:  1           Lab Results   Component (two) times daily as needed for Anxiety. , Print, Disp-6 tablet, R-0

## 2020-04-29 NOTE — ED INITIAL ASSESSMENT (HPI)
Patient reports high bp at home over the last 3 days; max 385 systolic. He also reports periodic heart palpitations.

## 2020-04-29 NOTE — TELEPHONE ENCOUNTER
From: Trevor Hart  To: Geovanni Ortega DO  Sent: 4/29/2020 3:19 PM CDT  Subject: Other    Good afternoon I have not been feeling that well for the past 3 days my blood pressure has been mild.  I been having sweats at night and feel my pulse from my heart no

## 2020-04-30 RX ORDER — PROPRANOLOL HYDROCHLORIDE 10 MG/1
10 TABLET ORAL 2 TIMES DAILY
Qty: 60 TABLET | Refills: 0 | Status: SHIPPED | OUTPATIENT
Start: 2020-04-30 | End: 2020-05-28

## 2020-04-30 NOTE — TELEPHONE ENCOUNTER
To Dr. Liliana Flores - ER  F/U  Pt reports feeling much better today, still has some palpitations. Advised pt to decrease media watching/listening  Xanex did not help - he was prescribed clonazapam in ER, he will try taking after work - is this OK?   Pt would like

## 2020-04-30 NOTE — TELEPHONE ENCOUNTER
Tried to call pt-no answer. Left message for him to call back. I will try again this evening. 1) I reviewed his labs--his thyroid is still off--I would stop his thyroid medication all together for now.  I also sent in a prescription for propanolol which

## 2020-04-30 NOTE — ED NOTES
Patient provided discharge instructions. Instructions reviewed with patient. Patient verbalized understanding. All questions/concerns addressed. Paper prescription given to patient.

## 2020-05-01 NOTE — TELEPHONE ENCOUNTER
Spoke with wife and relayed results/instructions.      He will stop synthroid; start propanolol  Call to make an appt for lab test for 4 weeks from now  Call in the interim with any additional concerns

## 2020-05-01 NOTE — TELEPHONE ENCOUNTER
Pt wife returned call, pt at work today  Pt still feeling the same, feeling palpitations  Please call wife to discuss  Tasked to Dr Nurys Hinton

## 2020-05-20 ENCOUNTER — PATIENT MESSAGE (OUTPATIENT)
Dept: INTERNAL MEDICINE CLINIC | Facility: CLINIC | Age: 45
End: 2020-05-20

## 2020-05-20 NOTE — TELEPHONE ENCOUNTER
From: Christopher Echavarria  To: Angy Rasmussen DO  Sent: 5/20/2020 2:08 PM CDT  Subject: Non-Urgent Medical Question    Hi just reaching out to schedule my appointment for blood work.

## 2020-05-21 NOTE — TELEPHONE ENCOUNTER
Attempted to fax orders for Lipid panel and TSH+T4 to [facility]. Fax failure received. Called and spoke to patient's wife and verified fax number #[phone]. Fax resent. Confirmation received.   Also reviewed with wife patient should fast for 12 hours shruthi

## 2020-05-21 NOTE — TELEPHONE ENCOUNTER
Wife calling for patient. Asking for lab orders to be faxed to [facility] Saturday 5/23 to have lab work done. Fax #[phone].

## 2020-05-26 ENCOUNTER — TELEPHONE (OUTPATIENT)
Dept: INTERNAL MEDICINE CLINIC | Facility: CLINIC | Age: 45
End: 2020-05-26

## 2020-05-26 DIAGNOSIS — E78.5 DYSLIPIDEMIA: Primary | ICD-10-CM

## 2020-05-26 NOTE — TELEPHONE ENCOUNTER
Please notify patient (ok also to call wife if pt does not answer) that I saw lab results    1) triglycerides are elevated at 413; he needs to take fish oil 2 grams daily. I would recommend checking his cholesterol again in 3 months.  I put an order in for

## 2020-05-27 NOTE — TELEPHONE ENCOUNTER
BOZENA to Dr. Gabrielle Khan---    Spoke to pt and advised on MD message below; pt verbalized understanding. Patient reports he has been feeling better and denies recurring palpitations. Provided contact # for Dr. Dawit Schumacher and Edgar Dodge. Reports he will call and schedule.

## 2020-05-28 RX ORDER — PROPRANOLOL HYDROCHLORIDE 10 MG/1
TABLET ORAL
Qty: 60 TABLET | Refills: 0 | Status: SHIPPED | OUTPATIENT
Start: 2020-05-28 | End: 2020-07-06

## 2020-06-01 ENCOUNTER — TELEMEDICINE (OUTPATIENT)
Dept: ENDOCRINOLOGY CLINIC | Facility: CLINIC | Age: 45
End: 2020-06-01

## 2020-06-01 DIAGNOSIS — E05.90 HYPERTHYROIDISM: Primary | ICD-10-CM

## 2020-06-01 PROCEDURE — 99243 OFF/OP CNSLTJ NEW/EST LOW 30: CPT | Performed by: INTERNAL MEDICINE

## 2020-06-01 NOTE — PROGRESS NOTES
Name: Jalyn Hanna  Date: 6/1/2020    This visit is conducted using Telemedicine with live, interactive video and audio. CC: Hyperthyroidism    HISTORY OF PRESENT ILLNESS   Jalyn Hanna is a 41year old male who presents for hyperthyroidism.   He Take 1 tablet (7.5 mg total) by mouth daily. , Disp: 30 tablet, Rfl: 1     Allergies:   No Known Allergies    Social History:   Social History    Socioeconomic History      Marital status:       Spouse name: Not on file      Number of children: Not o using two-way, real-time interactive audio and video communication.   This has been done in good ruben to provide continuity of care in the best interest of the provider-patient relationship, due to the ongoing public health crisis/national emergency and be

## 2020-06-13 ENCOUNTER — HOSPITAL ENCOUNTER (OUTPATIENT)
Dept: ULTRASOUND IMAGING | Age: 45
Discharge: HOME | End: 2020-06-13
Attending: INTERNAL MEDICINE
Payer: COMMERCIAL

## 2020-06-13 DIAGNOSIS — E05.90 HYPERTHYROIDISM: ICD-10-CM

## 2020-06-13 PROCEDURE — 76536 US EXAM OF HEAD AND NECK: CPT | Performed by: INTERNAL MEDICINE

## 2020-06-15 ENCOUNTER — PATIENT MESSAGE (OUTPATIENT)
Dept: ENDOCRINOLOGY CLINIC | Facility: CLINIC | Age: 45
End: 2020-06-15

## 2020-06-16 NOTE — TELEPHONE ENCOUNTER
Patient has question regarding result note below:  Viewed by Dianna Loomis on 6/15/2020  6:46 PM   Written by Leonel Thorpe MD on 6/15/2020 10:40 AM   Good news, thyroid ultrasound demonstrates inflammation but there are not any nodules.  Therefore no ne

## 2020-06-16 NOTE — TELEPHONE ENCOUNTER
From: Sergio Arriaga  To: Juan M Chaparro MD  Sent: 6/15/2020 6:46 PM CDT  Subject: Test Results Question    I have a question about US THYROID (RER=[id number]) resulted on 6/13/20, 2:22 PM.    So do I have to take any medication for my thyroid?

## 2020-06-17 ENCOUNTER — PATIENT MESSAGE (OUTPATIENT)
Dept: ENDOCRINOLOGY CLINIC | Facility: CLINIC | Age: 45
End: 2020-06-17

## 2020-06-17 NOTE — TELEPHONE ENCOUNTER
Lab orders faxed to [address] [place] [address] in [address] [place] [address] I at [phone].

## 2020-06-18 NOTE — TELEPHONE ENCOUNTER
Dr Jaci Gurrola since you received the [facility] results for pt and US. Does he need addl blood work ordered?

## 2020-06-18 NOTE — TELEPHONE ENCOUNTER
From: Dez Borges  To: Av Hernandez MD  Sent: 6/17/2020 6:07 PM CDT  Subject: Test Results Question    I'm confused because I had blood work for my thyroid and a ultrasound. Why do I need more lab work done?  My primary care doctor also order lab work f

## 2020-07-06 RX ORDER — PROPRANOLOL HYDROCHLORIDE 10 MG/1
TABLET ORAL
Qty: 60 TABLET | Refills: 0 | Status: SHIPPED | OUTPATIENT
Start: 2020-07-06 | End: 2020-08-17

## 2020-07-06 NOTE — TELEPHONE ENCOUNTER
To Dr. Shirley Zelaya he need to continue propanolol? Does he need any further medication for his thyroid?

## 2020-08-17 DIAGNOSIS — F41.9 ANXIETY: ICD-10-CM

## 2020-08-17 RX ORDER — ROSUVASTATIN CALCIUM 10 MG/1
10 TABLET, COATED ORAL DAILY
Qty: 90 TABLET | Refills: 1 | OUTPATIENT
Start: 2020-08-17

## 2020-08-17 RX ORDER — ALPRAZOLAM 0.25 MG/1
0.25 TABLET ORAL DAILY PRN
Qty: 30 TABLET | Refills: 0 | OUTPATIENT
Start: 2020-08-17

## 2020-08-18 ENCOUNTER — TELEPHONE (OUTPATIENT)
Dept: INTERNAL MEDICINE CLINIC | Facility: CLINIC | Age: 45
End: 2020-08-18

## 2020-08-18 DIAGNOSIS — F41.9 ANXIETY: ICD-10-CM

## 2020-08-19 RX ORDER — PROPRANOLOL HYDROCHLORIDE 10 MG/1
10 TABLET ORAL 2 TIMES DAILY
Qty: 60 TABLET | Refills: 0 | Status: SHIPPED | OUTPATIENT
Start: 2020-08-19 | End: 2020-10-28 | Stop reason: ALTCHOICE

## 2020-08-19 RX ORDER — ALPRAZOLAM 0.25 MG/1
0.25 TABLET ORAL DAILY PRN
Qty: 30 TABLET | Refills: 1 | Status: SHIPPED | OUTPATIENT
Start: 2020-08-19 | End: 2021-12-15

## 2020-08-19 NOTE — TELEPHONE ENCOUNTER
To MD:  The above refill request is for a controlled substance. Please review pended medication order. Last written 4/2/20 #30 with 0 refills. Per [place]  last dispensed 4/20/20. Last seen for annual physical December 2019.     To Dr. Kayla Black to please advis

## 2020-08-20 RX ORDER — PROPRANOLOL HYDROCHLORIDE 10 MG/1
10 TABLET ORAL 2 TIMES DAILY
Qty: 60 TABLET | Refills: 0 | Status: SHIPPED | OUTPATIENT
Start: 2020-08-20 | End: 2020-10-28 | Stop reason: ALTCHOICE

## 2020-10-14 ENCOUNTER — TELEPHONE (OUTPATIENT)
Dept: INTERNAL MEDICINE CLINIC | Facility: CLINIC | Age: 45
End: 2020-10-14

## 2020-10-15 NOTE — TELEPHONE ENCOUNTER
What lab orders does he need? Lipids? Dr. Gilbert John manages his thyroid. He is seeing Dr. Tisha Null next week but looks like it is just for dizziness, not his physical.  Last PSA was in 12/2019 so not due for that yet.

## 2020-10-16 NOTE — TELEPHONE ENCOUNTER
Called Rani to clarify on what orders need to be faxed to [facility]. Orders from 5/1 faxed as requested. Confirmation received.

## 2020-10-27 ENCOUNTER — TELEPHONE (OUTPATIENT)
Dept: INTERNAL MEDICINE CLINIC | Facility: CLINIC | Age: 45
End: 2020-10-27

## 2020-10-27 NOTE — TELEPHONE ENCOUNTER
[facility] lab results for TSH and Lipid faxed to Dr Gilbert John at [phone] with confirmation received. Labs sent to scanning.

## 2020-10-27 NOTE — TELEPHONE ENCOUNTER
I received the blood work from 10/17/2020.  Left a voicemail for patient regarding my assessment    TSH: Low less than 0.005  Free T4: 2.309    Lipid panel  –Total cholesterol 129  –Triglycerides elevated at 195  HDL low 37  VLDL 32  LDL 60  LDL/HDL ratio 1

## 2020-10-28 ENCOUNTER — OFFICE VISIT (OUTPATIENT)
Dept: INTERNAL MEDICINE CLINIC | Facility: CLINIC | Age: 45
End: 2020-10-28
Payer: COMMERCIAL

## 2020-10-28 VITALS
HEART RATE: 76 BPM | SYSTOLIC BLOOD PRESSURE: 142 MMHG | TEMPERATURE: 98 F | DIASTOLIC BLOOD PRESSURE: 74 MMHG | BODY MASS INDEX: 28 KG/M2 | OXYGEN SATURATION: 98 % | WEIGHT: 208 LBS

## 2020-10-28 DIAGNOSIS — F41.9 ANXIETY: ICD-10-CM

## 2020-10-28 DIAGNOSIS — E05.90 HYPERTHYROIDISM: Primary | ICD-10-CM

## 2020-10-28 DIAGNOSIS — K63.5 POLYP OF COLON, UNSPECIFIED PART OF COLON, UNSPECIFIED TYPE: ICD-10-CM

## 2020-10-28 DIAGNOSIS — E78.5 DYSLIPIDEMIA: ICD-10-CM

## 2020-10-28 PROCEDURE — 3077F SYST BP >= 140 MM HG: CPT | Performed by: INTERNAL MEDICINE

## 2020-10-28 PROCEDURE — 99214 OFFICE O/P EST MOD 30 MIN: CPT | Performed by: INTERNAL MEDICINE

## 2020-10-28 PROCEDURE — 3078F DIAST BP <80 MM HG: CPT | Performed by: INTERNAL MEDICINE

## 2020-10-28 RX ORDER — PROPRANOLOL HYDROCHLORIDE 10 MG/1
10 TABLET ORAL 2 TIMES DAILY
Qty: 60 TABLET | Refills: 3 | Status: SHIPPED | OUTPATIENT
Start: 2020-10-28 | End: 2020-12-17

## 2020-10-29 ENCOUNTER — TELEPHONE (OUTPATIENT)
Dept: ENDOCRINOLOGY CLINIC | Facility: CLINIC | Age: 45
End: 2020-10-29

## 2020-10-29 NOTE — TELEPHONE ENCOUNTER
rn called patient with message by dr Tristian Cole having NM scan next week 11/4  Per dr Arnulfo Alas book yolanda , sched 2/4/21

## 2020-10-29 NOTE — TELEPHONE ENCOUNTER
Please call patient - received copy of lab which demonstrate significant hyperthyroidism. Agree with plan for I-131 scan, did he schedule? Thanks.

## 2020-10-29 NOTE — PROGRESS NOTES
Dez Borges is a 41year old male.     HPI:   Patient presents with:  Dizziness      40 y/o M with hyperthyroidism here with c/o   1 month h/o dizziness; dizziness occurs daily; worse with head movement; last a few seconds; no hearing loss; no tinnitus; Rosuvastatin Calcium 10 MG Oral Tab Take 1 tablet (10 mg total) by mouth daily.  90 tablet 1       Allergies:  No Known Allergies              ROS:   Constitutional: no weight loss; no fatigue  Cardiovascular:  Negative for chest pain; negative palpitations by mouth 2 (two) times daily.        Imaging & Referrals:  NM THYROID UPTAKE & SCAN (CPT=[id number])     10/28/2020  Maria Eugenia Bird MD

## 2020-11-01 ENCOUNTER — LAB ENCOUNTER (OUTPATIENT)
Dept: LAB | Facility: HOSPITAL | Age: 45
End: 2020-11-01
Attending: INTERNAL MEDICINE
Payer: COMMERCIAL

## 2020-11-01 DIAGNOSIS — E78.5 DYSLIPIDEMIA: ICD-10-CM

## 2020-11-01 DIAGNOSIS — E78.1 HYPERTRIGLYCERIDEMIA: ICD-10-CM

## 2020-11-01 DIAGNOSIS — E03.9 HYPOTHYROIDISM, UNSPECIFIED TYPE: ICD-10-CM

## 2020-11-01 DIAGNOSIS — Z00.00 ANNUAL PHYSICAL EXAM: ICD-10-CM

## 2020-11-01 DIAGNOSIS — E05.90 HYPERTHYROIDISM: ICD-10-CM

## 2020-11-01 PROCEDURE — 84439 ASSAY OF FREE THYROXINE: CPT

## 2020-11-01 PROCEDURE — 84481 FREE ASSAY (FT-3): CPT

## 2020-11-01 PROCEDURE — 36415 COLL VENOUS BLD VENIPUNCTURE: CPT

## 2020-11-01 PROCEDURE — 80061 LIPID PANEL: CPT

## 2020-11-01 PROCEDURE — 86376 MICROSOMAL ANTIBODY EACH: CPT

## 2020-11-01 PROCEDURE — 84443 ASSAY THYROID STIM HORMONE: CPT

## 2020-11-01 PROCEDURE — 80053 COMPREHEN METABOLIC PANEL: CPT

## 2020-11-02 ENCOUNTER — TELEPHONE (OUTPATIENT)
Dept: INTERNAL MEDICINE CLINIC | Facility: CLINIC | Age: 45
End: 2020-11-02

## 2020-11-02 DIAGNOSIS — E05.90 HYPERTHYROIDISM: Primary | ICD-10-CM

## 2020-11-04 ENCOUNTER — HOSPITAL ENCOUNTER (OUTPATIENT)
Dept: NUCLEAR MEDICINE | Facility: HOSPITAL | Age: 45
Discharge: HOME | End: 2020-11-04
Attending: INTERNAL MEDICINE
Payer: COMMERCIAL

## 2020-11-04 DIAGNOSIS — E05.90 HYPERTHYROIDISM: ICD-10-CM

## 2020-11-04 PROCEDURE — 78014 THYROID IMAGING W/BLOOD FLOW: CPT | Performed by: INTERNAL MEDICINE

## 2020-11-06 ENCOUNTER — TELEPHONE (OUTPATIENT)
Dept: ENDOCRINOLOGY CLINIC | Facility: CLINIC | Age: 45
End: 2020-11-06

## 2020-11-09 RX ORDER — METHIMAZOLE 10 MG/1
10 TABLET ORAL 2 TIMES DAILY
Qty: 60 TABLET | Refills: 1 | Status: SHIPPED | OUTPATIENT
Start: 2020-11-09 | End: 2020-12-17

## 2020-11-09 NOTE — TELEPHONE ENCOUNTER
Sorry for delay the result went to his PCP. His scan does demonstrate diffuse hyperthyroidism consistent with Graves Disease. Start Methimazole 10mg PO BID #60, refill 2 and recheck tSH, FT4, FT3 in one month. Thanks.

## 2020-11-09 NOTE — TELEPHONE ENCOUNTER
Dr. Jasvir Chen,  Patient's wife calling for results from NM thyroid uptake & scan done 11/4/20    Please advise-thanks

## 2020-11-09 NOTE — TELEPHONE ENCOUNTER
To Dr. Justyn Maldonado to please advise on below message, thank you----    *Dr. Dimas Hassan, please disregard, routed erroneously.

## 2020-11-09 NOTE — TELEPHONE ENCOUNTER
Please call pt with results of labs and scan of thyroid done on Wednesday  Pt and spouse are hoping to hear back today  Tasked to nursing

## 2020-11-10 NOTE — TELEPHONE ENCOUNTER
rn called patient wife and states she received message by dr Maritza Marsh yesterday . rn reminded about blood tests.   Picked up rx at [address] another enc addressing same message

## 2020-11-10 NOTE — TELEPHONE ENCOUNTER
TPO elevated at 5233; free T3 5.14, TSH <0.. 005, free T4 1.4; NM uptake and scan shows MCKEON-uptake elevated at 39%    Imp- hyperthyroidism due to Grave's disease    Rec- as recommended by Dr Elton Linder, advise start methimazole 10 mg po BID; re-check TSH, free T

## 2020-11-10 NOTE — TELEPHONE ENCOUNTER
Appears Dr. Shelby Case gave plan as provided to staff. Yes please ok to overbook in one month - ok for virtual visit. Thanks.

## 2020-12-03 ENCOUNTER — TELEPHONE (OUTPATIENT)
Dept: GASTROENTEROLOGY | Facility: CLINIC | Age: 45
End: 2020-12-03

## 2020-12-03 DIAGNOSIS — R19.7 DIARRHEA, UNSPECIFIED TYPE: Primary | ICD-10-CM

## 2020-12-04 NOTE — TELEPHONE ENCOUNTER
On Call Note:    Called patient and says he has been having diarrhea and left sided abdominal pain for the last 2 days. Does say it is similar to 1 year ago when he saw me. Has been recently started on medication for his thyroid. No other new medications.

## 2020-12-05 ENCOUNTER — LAB ENCOUNTER (OUTPATIENT)
Dept: LAB | Facility: HOSPITAL | Age: 45
End: 2020-12-05
Attending: INTERNAL MEDICINE
Payer: COMMERCIAL

## 2020-12-05 DIAGNOSIS — R19.7 DIARRHEA, UNSPECIFIED TYPE: ICD-10-CM

## 2020-12-05 PROCEDURE — 87045 FECES CULTURE AEROBIC BACT: CPT

## 2020-12-05 PROCEDURE — 87329 GIARDIA AG IA: CPT

## 2020-12-05 PROCEDURE — 87427 SHIGA-LIKE TOXIN AG IA: CPT

## 2020-12-05 PROCEDURE — 87493 C DIFF AMPLIFIED PROBE: CPT

## 2020-12-05 PROCEDURE — 87046 STOOL CULTR AEROBIC BACT EA: CPT

## 2020-12-05 PROCEDURE — 87272 CRYPTOSPORIDIUM AG IF: CPT

## 2020-12-07 ENCOUNTER — PATIENT MESSAGE (OUTPATIENT)
Dept: GASTROENTEROLOGY | Facility: CLINIC | Age: 45
End: 2020-12-07

## 2020-12-07 ENCOUNTER — APPOINTMENT (OUTPATIENT)
Dept: CT IMAGING | Facility: HOSPITAL | Age: 45
End: 2020-12-07
Attending: EMERGENCY MEDICINE
Payer: COMMERCIAL

## 2020-12-07 ENCOUNTER — TELEPHONE (OUTPATIENT)
Dept: GASTROENTEROLOGY | Facility: CLINIC | Age: 45
End: 2020-12-07

## 2020-12-07 ENCOUNTER — HOSPITAL ENCOUNTER (EMERGENCY)
Facility: HOSPITAL | Age: 45
Discharge: HOME | End: 2020-12-07
Attending: EMERGENCY MEDICINE
Payer: COMMERCIAL

## 2020-12-07 VITALS
RESPIRATION RATE: 14 BRPM | HEART RATE: 60 BPM | DIASTOLIC BLOOD PRESSURE: 91 MMHG | SYSTOLIC BLOOD PRESSURE: 138 MMHG | OXYGEN SATURATION: 98 % | TEMPERATURE: 98 F

## 2020-12-07 DIAGNOSIS — R74.8 ELEVATED LIVER ENZYMES: Primary | ICD-10-CM

## 2020-12-07 DIAGNOSIS — K52.9 COLITIS: Primary | ICD-10-CM

## 2020-12-07 PROCEDURE — 85025 COMPLETE CBC W/AUTO DIFF WBC: CPT

## 2020-12-07 PROCEDURE — 99284 EMERGENCY DEPT VISIT MOD MDM: CPT

## 2020-12-07 PROCEDURE — 84484 ASSAY OF TROPONIN QUANT: CPT

## 2020-12-07 PROCEDURE — 96374 THER/PROPH/DIAG INJ IV PUSH: CPT

## 2020-12-07 PROCEDURE — 93010 ELECTROCARDIOGRAM REPORT: CPT | Performed by: EMERGENCY MEDICINE

## 2020-12-07 PROCEDURE — 74177 CT ABD & PELVIS W/CONTRAST: CPT | Performed by: EMERGENCY MEDICINE

## 2020-12-07 PROCEDURE — 96372 THER/PROPH/DIAG INJ SC/IM: CPT

## 2020-12-07 PROCEDURE — 93005 ELECTROCARDIOGRAM TRACING: CPT

## 2020-12-07 PROCEDURE — 85025 COMPLETE CBC W/AUTO DIFF WBC: CPT | Performed by: EMERGENCY MEDICINE

## 2020-12-07 PROCEDURE — 84484 ASSAY OF TROPONIN QUANT: CPT | Performed by: EMERGENCY MEDICINE

## 2020-12-07 PROCEDURE — 96361 HYDRATE IV INFUSION ADD-ON: CPT

## 2020-12-07 PROCEDURE — 80076 HEPATIC FUNCTION PANEL: CPT | Performed by: EMERGENCY MEDICINE

## 2020-12-07 PROCEDURE — 80048 BASIC METABOLIC PNL TOTAL CA: CPT

## 2020-12-07 PROCEDURE — 81001 URINALYSIS AUTO W/SCOPE: CPT | Performed by: EMERGENCY MEDICINE

## 2020-12-07 PROCEDURE — 83690 ASSAY OF LIPASE: CPT | Performed by: EMERGENCY MEDICINE

## 2020-12-07 PROCEDURE — 80048 BASIC METABOLIC PNL TOTAL CA: CPT | Performed by: EMERGENCY MEDICINE

## 2020-12-07 RX ORDER — DICYCLOMINE HYDROCHLORIDE 10 MG/1
10 CAPSULE ORAL 2 TIMES DAILY PRN
Qty: 180 CAPSULE | Refills: 0 | Status: SHIPPED | OUTPATIENT
Start: 2020-12-07 | End: 2021-06-01

## 2020-12-07 RX ORDER — KETOROLAC TROMETHAMINE 15 MG/ML
15 INJECTION, SOLUTION INTRAMUSCULAR; INTRAVENOUS ONCE
Status: COMPLETED | OUTPATIENT
Start: 2020-12-07 | End: 2020-12-07

## 2020-12-07 RX ORDER — DICYCLOMINE HYDROCHLORIDE 10 MG/ML
20 INJECTION INTRAMUSCULAR ONCE
Status: COMPLETED | OUTPATIENT
Start: 2020-12-07 | End: 2020-12-07

## 2020-12-07 NOTE — TELEPHONE ENCOUNTER
From: Yuliana Mora  To:  Teri Zarate MD  Sent: 12/7/2020 11:23 AM CST  Subject: Prescription Question    I don't have any more dicyclomine can you send me a new prescription

## 2020-12-07 NOTE — ED PROVIDER NOTES
Patient Seen in: [facility] AND [facility] Emergency Department      History   No chief complaint on file. Stated Complaint: Abdominal Pain    HPI    41 yoM with history of diverticulitis, presenting for evaluation of lower abdominal pain.   He is not having d Resp 18   Temp 97.8 °F (36.6 °C)   Temp src Oral   SpO2 98 %   O2 Device None (Room air)       Current:/88   Pulse 54   Temp 97.8 °F (36.6 °C) (Oral)   Resp 13   SpO2 97%         Physical Exam  Vitals signs and nursing note reviewed.    Constitution -----------         ------                     CBC W/ DIFFERENTIAL[[id number]]                              Final result                 Please view results for these tests on the individual orders.    [facility] possible for a visit            Medications Prescribed:  Current Discharge Medication List

## 2020-12-07 NOTE — TELEPHONE ENCOUNTER
FYI - patient returned call - accepted appt to see Dr Mercy Coburn at [facility] on Tuesday, 12/15/20 at [address] [place] [address].  Thank you.

## 2020-12-07 NOTE — TELEPHONE ENCOUNTER
Left general message to f/u w/ pharmacy for  time and cost for his RX. Repeat labs x 1 wk, and schedule f/u appt with Dr. Kike Patrick at [address] [place] [address],[facility] 1.

## 2020-12-07 NOTE — TELEPHONE ENCOUNTER
Romeo Bermudez  to Lamont Tovar MD          12/7/20 11:23 AM  I don't have any more dicyclomine can you send me a new prescription

## 2020-12-07 NOTE — TELEPHONE ENCOUNTER
Prescription signed. Outpatient follow up would be fine.  A 9 AM Tuesday at [facility] el is ok    Thanks    Teri Zarate MD  [facility], [facility] - Gastroenterology  12/7/2020  12:43 PM

## 2020-12-07 NOTE — TELEPHONE ENCOUNTER
Pts wife states that pt went to ER last night  and was told that he has colitis. Pt is still having abdominal pain. No medication was given when he was in ER. Pt also talked to Dr. Bibi Dow on 12/3/20 regarding symptoms. Please call.

## 2020-12-07 NOTE — ED NOTES
Discharge instructions reviewed. Pt verbalized understanding with no further questions. Pain controlled. Steady gait. Speaking in full clear sentences at discharge. Pt spouse present at bedside for discharge instructions.

## 2020-12-07 NOTE — TELEPHONE ENCOUNTER
Dr. Bradley Monteiro-    Please review and sign pended dicyclomine order, thank you. A 90-day supply was pended without refills--> did you want the pt to schedule OV after repeating labs in 1 wk? Please advise, thank you.

## 2020-12-07 NOTE — TELEPHONE ENCOUNTER
Noted, thank you.   Future Appointments   Date Time Provider Yola Doty   12/15/2020  9:00 AM MD Toney Cadegreg

## 2020-12-07 NOTE — TELEPHONE ENCOUNTER
Viewed by Sergio Arriaga on 12/7/2020 10:51 AM  Written by Amelia Hankins MD on 12/7/2020 \"10:47 AM  Hi Mr. Jossue Tenorio,     The stool tests did not show any infection.  I did see you were in the emergency department today and saw your CT scan which was

## 2020-12-07 NOTE — ED INITIAL ASSESSMENT (HPI)
S: Abdominal pain llq  B: C/o LLQ abdominal pain since Wednesday. Hx of IBS and diverticulitis. Denies diarrhea, vomiting, or fever.

## 2020-12-08 ENCOUNTER — PATIENT MESSAGE (OUTPATIENT)
Dept: INTERNAL MEDICINE CLINIC | Facility: CLINIC | Age: 45
End: 2020-12-08

## 2020-12-09 ENCOUNTER — TELEPHONE (OUTPATIENT)
Dept: INTERNAL MEDICINE CLINIC | Facility: CLINIC | Age: 45
End: 2020-12-09

## 2020-12-09 NOTE — TELEPHONE ENCOUNTER
From: Piper Deshpande  To: Antionette Valencia MD  Sent: 12/8/2020 5:48 PM CST  Subject: Prescription Question    Good afternoon I'm wondering how long do I need to take my medication for propranolol? I'm not feeling the palpitations anymore.

## 2020-12-09 NOTE — TELEPHONE ENCOUNTER
Dipti Molina  to Don Goode MD          12/8/20 5:48 PM  Good afternoon I'm wondering how long do I need to take my medication for propranolol? I'm not feeling the palpitations anymore. To DR. JEWELL

## 2020-12-09 NOTE — TELEPHONE ENCOUNTER
Pt has hyperthyroidism; he was instructed to see endocrinologist, Christine Massey 68. [address] [facility]    Phone [phone]    Has he seen Dr Mati Lepe yet? Does he have appt? Is he still taking methimazole 10 mg po BID?  Has he had r

## 2020-12-09 NOTE — TELEPHONE ENCOUNTER
As FYI  To AMADO JEWELL - called patient and relayed DR. JEWELL message - he has yolanda with DR. Mae 12/15 , he will go for blood work on Õie 16.  He is still taking Methimazole 10 mg BID

## 2020-12-09 NOTE — TELEPHONE ENCOUNTER
Called spouse and relayed DR. JEWELL message - verbalized understanding.  Told her that labs were entered in november for Thyroid labs that patient should have

## 2020-12-09 NOTE — TELEPHONE ENCOUNTER
Wife - King Brenna called  Requests call back from nursing to review information that was given to her  as he did not understand it all [phone]

## 2020-12-12 ENCOUNTER — LAB ENCOUNTER (OUTPATIENT)
Dept: LAB | Facility: HOSPITAL | Age: 45
End: 2020-12-12
Attending: INTERNAL MEDICINE
Payer: COMMERCIAL

## 2020-12-12 DIAGNOSIS — R74.8 ELEVATED LIVER ENZYMES: ICD-10-CM

## 2020-12-12 DIAGNOSIS — E05.90 HYPERTHYROIDISM: ICD-10-CM

## 2020-12-12 PROCEDURE — 86708 HEPATITIS A ANTIBODY: CPT

## 2020-12-12 PROCEDURE — 86803 HEPATITIS C AB TEST: CPT

## 2020-12-12 PROCEDURE — 87340 HEPATITIS B SURFACE AG IA: CPT

## 2020-12-12 PROCEDURE — 80076 HEPATIC FUNCTION PANEL: CPT

## 2020-12-12 PROCEDURE — 86706 HEP B SURFACE ANTIBODY: CPT

## 2020-12-12 PROCEDURE — 80500 HEPATITIS A B + C PROFILE: CPT

## 2020-12-12 PROCEDURE — 86709 HEPATITIS A IGM ANTIBODY: CPT

## 2020-12-12 PROCEDURE — 84439 ASSAY OF FREE THYROXINE: CPT

## 2020-12-12 PROCEDURE — 86704 HEP B CORE ANTIBODY TOTAL: CPT

## 2020-12-12 PROCEDURE — 84443 ASSAY THYROID STIM HORMONE: CPT

## 2020-12-12 PROCEDURE — 84481 FREE ASSAY (FT-3): CPT

## 2020-12-12 PROCEDURE — 36415 COLL VENOUS BLD VENIPUNCTURE: CPT

## 2020-12-14 NOTE — PROGRESS NOTES
[address] [facility] - Gastroenterology                                                                                                  Clinic Progress Note    Patient pr Performed by Beverley Sparrow MD at [address] [facility] ENDOSCOPY   • PTERYGIUM EXCISION - OD - RIGHT EYE  around  per patient   • REMOVAL GALLBLADDER        Family Hx:   Family History   Problem Relation Age of Onset   • Other (systemic lupus) Mother          not enlarged and without nodules  CV: RRR  Resp: non-labored breathing  Abd: soft, non-tender, non-distended  Ext: no lower extremity swelling  Neuro: Alert, Oriented X 3  Skin: no rashes, bruises  Psych: normal affect    Labs/Imaging:     Patient's labs a bleeding  -hep B vaccination  -colonoscopy 11/2024    Orders This Visit:  No orders of the defined types were placed in this encounter.     Meds This Visit:  Requested Prescriptions      No prescriptions requested or ordered in this encounter     Imaging &

## 2020-12-15 ENCOUNTER — OFFICE VISIT (OUTPATIENT)
Dept: GASTROENTEROLOGY | Facility: CLINIC | Age: 45
End: 2020-12-15
Payer: COMMERCIAL

## 2020-12-15 VITALS
WEIGHT: 216.38 LBS | SYSTOLIC BLOOD PRESSURE: 130 MMHG | DIASTOLIC BLOOD PRESSURE: 86 MMHG | TEMPERATURE: 98 F | BODY MASS INDEX: 29.31 KG/M2 | HEIGHT: 72 IN | HEART RATE: 65 BPM

## 2020-12-15 DIAGNOSIS — R93.5 ABNORMAL CT SCAN, PELVIS: ICD-10-CM

## 2020-12-15 DIAGNOSIS — R93.5 ABNORMAL CT OF THE ABDOMEN: ICD-10-CM

## 2020-12-15 DIAGNOSIS — R10.9 ABDOMINAL DISCOMFORT: Primary | ICD-10-CM

## 2020-12-15 DIAGNOSIS — K58.9 IRRITABLE BOWEL SYNDROME, UNSPECIFIED TYPE: ICD-10-CM

## 2020-12-15 DIAGNOSIS — R19.4 ALTERED BOWEL HABITS: ICD-10-CM

## 2020-12-15 DIAGNOSIS — R74.8 ELEVATED LIVER ENZYMES: ICD-10-CM

## 2020-12-15 PROCEDURE — 99214 OFFICE O/P EST MOD 30 MIN: CPT | Performed by: INTERNAL MEDICINE

## 2020-12-15 PROCEDURE — 3079F DIAST BP 80-89 MM HG: CPT | Performed by: INTERNAL MEDICINE

## 2020-12-15 PROCEDURE — 3008F BODY MASS INDEX DOCD: CPT | Performed by: INTERNAL MEDICINE

## 2020-12-15 PROCEDURE — 3075F SYST BP GE 130 - 139MM HG: CPT | Performed by: INTERNAL MEDICINE

## 2020-12-15 NOTE — PATIENT INSTRUCTIONS
1. Continue your dicyclomine as needed for abdominal discomfort for irritable bowel syndrome    2. Continue a general diet. Limit coffee/energy drinks, dairy/milk for now. 3. If you're having more diarrhea, or see blood in the stool, let me know.  We estrella

## 2020-12-17 ENCOUNTER — TELEMEDICINE (OUTPATIENT)
Dept: ENDOCRINOLOGY CLINIC | Facility: CLINIC | Age: 45
End: 2020-12-17
Payer: COMMERCIAL

## 2020-12-17 DIAGNOSIS — E05.90 HYPERTHYROIDISM: ICD-10-CM

## 2020-12-17 PROCEDURE — 99213 OFFICE O/P EST LOW 20 MIN: CPT | Performed by: INTERNAL MEDICINE

## 2020-12-17 RX ORDER — METHIMAZOLE 10 MG/1
10 TABLET ORAL DAILY
Qty: 30 TABLET | Refills: 3 | Status: SHIPPED | OUTPATIENT
Start: 2020-12-17 | End: 2021-02-04

## 2020-12-17 NOTE — PROGRESS NOTES
Telehealth outside of [employer] Verbal Consent   I conducted a telehealth visit with Kaila George today, 12/17/20, which was completed using two-way, real-time interactive audio and video communication.  This has been done in good ruben to [employer] public health crisis/national emergency where restrictions of face-to-face office visits are ongoing. Every conscious effort was taken to allow for sufficient and adequate time to complete the visit.   The patient was made aware of the limitations of the te No    12/2020  Since last visit he was diagnosed with Graves Disease. His I-131 scan confirmed diffuse uptake at 39% consistent with the disease. He has been started on Methimazole 10mg PO BID and tolerating medication well. No palpitations.   No tremor Comment: on occ      Drug use: No    Other Topics      Concerns:        Caffeine Concern: Yes          Coffee      Medical History:   Past Medical History:   Diagnosis Date   • Colon adenoma 11/25/2019   • High cholesterol    • Hyperlipidemia    • Hypothyr

## 2021-02-01 ENCOUNTER — LAB ENCOUNTER (OUTPATIENT)
Dept: LAB | Age: 46
End: 2021-02-01
Attending: INTERNAL MEDICINE
Payer: COMMERCIAL

## 2021-02-01 DIAGNOSIS — E05.90 HYPERTHYROIDISM: ICD-10-CM

## 2021-02-01 LAB
T3FREE SERPL-MCNC: 2.75 PG/ML (ref 2.4–4.2)
T4 FREE SERPL-MCNC: 0.8 NG/DL (ref 0.8–1.7)
THYROPEROXIDASE AB SERPL-ACNC: 1859 U/ML (ref ?–60)
TSI SER-ACNC: 5.13 MIU/ML (ref 0.36–3.74)

## 2021-02-01 PROCEDURE — 86376 MICROSOMAL ANTIBODY EACH: CPT

## 2021-02-01 PROCEDURE — 84443 ASSAY THYROID STIM HORMONE: CPT

## 2021-02-01 PROCEDURE — 84439 ASSAY OF FREE THYROXINE: CPT

## 2021-02-01 PROCEDURE — 84445 ASSAY OF TSI GLOBULIN: CPT

## 2021-02-01 PROCEDURE — 84481 FREE ASSAY (FT-3): CPT

## 2021-02-01 PROCEDURE — 36415 COLL VENOUS BLD VENIPUNCTURE: CPT

## 2021-02-04 ENCOUNTER — TELEMEDICINE (OUTPATIENT)
Dept: ENDOCRINOLOGY CLINIC | Facility: CLINIC | Age: 46
End: 2021-02-04

## 2021-02-04 DIAGNOSIS — E05.90 HYPERTHYROIDISM: Primary | ICD-10-CM

## 2021-02-04 LAB — THYROID STIMULATING IMMUNOGLOBULIN: 0.95 IU/L

## 2021-02-04 PROCEDURE — 99213 OFFICE O/P EST LOW 20 MIN: CPT | Performed by: INTERNAL MEDICINE

## 2021-02-04 RX ORDER — METHIMAZOLE 5 MG/1
5 TABLET ORAL 3 TIMES DAILY
Qty: 90 TABLET | Refills: 1 | Status: SHIPPED | OUTPATIENT
Start: 2021-02-04 | End: 2021-05-03

## 2021-02-05 NOTE — PROGRESS NOTES
Please note that the following visit was completed using two-way, real-time interactive audio and video communication.   This has been done in good ruben to provide continuity of care in the best interest of the provider-patient relationship, due to the kita headache, generalized or focal weakness or numbness.   Head: normal  ENT: normal  Lungs: no shortness of breath, wheezing or SMALLS  Cardiovascular:  no chest pain or palpitations  Gastrointestinal:  no abdominal pain, bowel movement problems  Musculoskeletal: [facility] ENDOSCOPY   • PTERYGIUM EXCISION - OD - RIGHT EYE  around 2004 per patient   • REMOVAL GALLBLADDER         PHYSICAL EXAM  General Appearance:  alert, well developed, in no acute distress  Eyes:  normal conjunctivae, sclera. , normal sclera and normal pu

## 2021-02-11 ENCOUNTER — PATIENT MESSAGE (OUTPATIENT)
Dept: ENDOCRINOLOGY CLINIC | Facility: CLINIC | Age: 46
End: 2021-02-11

## 2021-03-03 ENCOUNTER — PATIENT MESSAGE (OUTPATIENT)
Dept: ENDOCRINOLOGY CLINIC | Facility: CLINIC | Age: 46
End: 2021-03-03

## 2021-03-04 NOTE — TELEPHONE ENCOUNTER
From: Gabriela Lomeli  To: Wilbur Aaron MD  Sent: 3/3/2021 4:33 AM CST  Subject: Non-Urgent Medical Question    I been taking methimazole 5mg. I been experiencing muscle pain and headache and feel very tired and My pulse has been high.  I don't know if it's

## 2021-04-24 ENCOUNTER — IMMUNIZATION (OUTPATIENT)
Dept: LAB | Facility: HOSPITAL | Age: 46
End: 2021-04-24
Attending: HOSPITALIST
Payer: COMMERCIAL

## 2021-04-24 DIAGNOSIS — Z23 NEED FOR VACCINATION: Primary | ICD-10-CM

## 2021-04-24 PROCEDURE — 0011A SARSCOV2 VAC 100MCG/0.5ML IM: CPT

## 2021-04-29 ENCOUNTER — TELEPHONE (OUTPATIENT)
Dept: ENDOCRINOLOGY CLINIC | Facility: CLINIC | Age: 46
End: 2021-04-29

## 2021-04-29 NOTE — TELEPHONE ENCOUNTER
pts wife wants to know if the pt. will need to fast for his lab tests? Wife is requesting for the nurse to send message to the pt. Through My chart.

## 2021-04-29 NOTE — TELEPHONE ENCOUNTER
Per chart review, patient has outstanding thyroid lab orders.   Patient was notified fasting is not required via mychart per below request.

## 2021-04-30 ENCOUNTER — LAB ENCOUNTER (OUTPATIENT)
Dept: LAB | Age: 46
End: 2021-04-30
Attending: INTERNAL MEDICINE
Payer: COMMERCIAL

## 2021-04-30 DIAGNOSIS — E05.90 HYPERTHYROIDISM: ICD-10-CM

## 2021-04-30 PROCEDURE — 84439 ASSAY OF FREE THYROXINE: CPT

## 2021-04-30 PROCEDURE — 84481 FREE ASSAY (FT-3): CPT

## 2021-04-30 PROCEDURE — 84443 ASSAY THYROID STIM HORMONE: CPT

## 2021-04-30 PROCEDURE — 36415 COLL VENOUS BLD VENIPUNCTURE: CPT

## 2021-05-03 ENCOUNTER — OFFICE VISIT (OUTPATIENT)
Dept: ENDOCRINOLOGY CLINIC | Facility: CLINIC | Age: 46
End: 2021-05-03
Payer: COMMERCIAL

## 2021-05-03 VITALS
BODY MASS INDEX: 30 KG/M2 | HEART RATE: 64 BPM | SYSTOLIC BLOOD PRESSURE: 142 MMHG | WEIGHT: 224 LBS | DIASTOLIC BLOOD PRESSURE: 94 MMHG

## 2021-05-03 DIAGNOSIS — E05.90 HYPERTHYROIDISM: Primary | ICD-10-CM

## 2021-05-03 PROCEDURE — 3077F SYST BP >= 140 MM HG: CPT | Performed by: INTERNAL MEDICINE

## 2021-05-03 PROCEDURE — 99213 OFFICE O/P EST LOW 20 MIN: CPT | Performed by: INTERNAL MEDICINE

## 2021-05-03 PROCEDURE — 3080F DIAST BP >= 90 MM HG: CPT | Performed by: INTERNAL MEDICINE

## 2021-05-03 RX ORDER — METHIMAZOLE 5 MG/1
5 TABLET ORAL DAILY
Qty: 90 TABLET | Refills: 1 | Status: SHIPPED | OUTPATIENT
Start: 2021-05-03 | End: 2021-11-04

## 2021-05-03 NOTE — PROGRESS NOTES
Name: Jay Cohen  Date: 5/3/2021    CC: Hyperthyroidism    HISTORY OF PRESENT ILLNESS   Jay Cohen is a 40year old male who presents for hyperthyroidism.   He was previously maintained on levothyroxine therapy and dose has been significantly decr total) by mouth 3 (three) times daily. , Disp: 90 tablet, Rfl: 1  •  Dicyclomine HCl 10 MG Oral Cap, Take 1 capsule (10 mg total) by mouth 2 (two) times daily as needed. , Disp: 180 capsule, Rfl: 0  •  ALPRAZolam (XANAX) 0.25 MG Oral Tab, Take 1 tablet (0.25 breathing. Skin:  normal moisture and skin texture  Hematologic:  no excessive bruising  Psychiatric:  oriented to time, self, and place      ASSESSMENT/PLAN:    1.  Hyperthyroidism  - Discussed diagnosis of Graves Disease  - Discussed treatment for 12-1

## 2021-05-22 ENCOUNTER — IMMUNIZATION (OUTPATIENT)
Dept: LAB | Facility: HOSPITAL | Age: 46
End: 2021-05-22
Attending: EMERGENCY MEDICINE
Payer: COMMERCIAL

## 2021-05-22 DIAGNOSIS — Z23 NEED FOR VACCINATION: Primary | ICD-10-CM

## 2021-05-22 PROCEDURE — 0012A SARSCOV2 VAC 100MCG/0.5ML IM: CPT

## 2021-06-01 ENCOUNTER — OFFICE VISIT (OUTPATIENT)
Dept: INTERNAL MEDICINE CLINIC | Facility: CLINIC | Age: 46
End: 2021-06-01
Payer: COMMERCIAL

## 2021-06-01 ENCOUNTER — TELEPHONE (OUTPATIENT)
Dept: INTERNAL MEDICINE CLINIC | Facility: CLINIC | Age: 46
End: 2021-06-01

## 2021-06-01 VITALS
HEIGHT: 72 IN | OXYGEN SATURATION: 98 % | BODY MASS INDEX: 30.2 KG/M2 | SYSTOLIC BLOOD PRESSURE: 128 MMHG | HEART RATE: 76 BPM | WEIGHT: 223 LBS | DIASTOLIC BLOOD PRESSURE: 96 MMHG

## 2021-06-01 DIAGNOSIS — J06.9 VIRAL UPPER RESPIRATORY TRACT INFECTION: Primary | ICD-10-CM

## 2021-06-01 PROCEDURE — 99213 OFFICE O/P EST LOW 20 MIN: CPT | Performed by: INTERNAL MEDICINE

## 2021-06-01 PROCEDURE — 3008F BODY MASS INDEX DOCD: CPT | Performed by: INTERNAL MEDICINE

## 2021-06-01 PROCEDURE — 3074F SYST BP LT 130 MM HG: CPT | Performed by: INTERNAL MEDICINE

## 2021-06-01 PROCEDURE — 3080F DIAST BP >= 90 MM HG: CPT | Performed by: INTERNAL MEDICINE

## 2021-06-01 NOTE — TELEPHONE ENCOUNTER
Per  documentation in wife's record:     Briana Fuller returned call     Scheduled an appt for her  to be seen today at [address] [facility] with Dr Danis Jesus   - pt needed a later time today\"    FYI Dr. Danis Jesus.

## 2021-06-01 NOTE — TELEPHONE ENCOUNTER
Copied Mychart from pts wife re: Jeff into TE for Triage:  (Addressed Rani's Status Update in her chart)      Visit Follow-up Question    Thierno Romero MD 35 minutes ago (9:50 AM)     Good morning I'm feeling better still having run

## 2021-06-01 NOTE — PROGRESS NOTES
Mita Trejo is a 40year old male. Patient presents with: Body ache and/or chills  Fever  Cough: Productive cough     HPI:     Wife had a cold last Thursday, then pt developed sx on Friday -- runny nose, sore throat, body aches, cough with green mucus. AND PLAN:     1.  Viral upper respiratory tract infection  Rapid covid test negative 5/30  Pt completed his covid (Darlin Kingsley) vaccination on 5/22  Covid PCR ordered (may be required by his work per pt)  Rec mucinex DM, flonase, push fluids        The patient

## 2021-06-01 NOTE — TELEPHONE ENCOUNTER
To on-call physician to please advise in PCP absence-----  Pt's wife reports pt developed runny nose with green mucus, productive cough, sore throat, dull headache described as sinus pressure. Denies fever, SOB, wheezing, loss of sense of smell or taste.  P

## 2021-06-01 NOTE — TELEPHONE ENCOUNTER
I'd like to evlauate the patient. Can come with his wife who I advised to come in too.      Rasheed Castro

## 2021-09-16 NOTE — TELEPHONE ENCOUNTER
From: Dundee Degree  To: Nancy Charles MD  Sent: 2/11/2021 6:21 PM CST  Subject: Prescription Question    I was told by Dr Melquiades Lomas to only take 1 pill a day for my new dose the prescription bottle says 3 times a day witch is correct SAM:   1) Home with family   2) Pt's family will drive pt home at time of discharge   3) Risk of readmission- 7% LOW     Hospital Day 3:   4:36 PM- Pt remains on Ortho- plans to d/c home with follow up appt and family assistance, family will drive pt home at time of discharge and as needed. Anticipated d/c today this evening vs. Tomorrow 9/17. CM will continue to follow and assist as needed.      Danny Aponte, MSW, [address]

## 2021-11-02 ENCOUNTER — PATIENT MESSAGE (OUTPATIENT)
Dept: ENDOCRINOLOGY CLINIC | Facility: CLINIC | Age: 46
End: 2021-11-02

## 2021-11-02 DIAGNOSIS — E05.90 HYPERTHYROIDISM: Primary | ICD-10-CM

## 2021-11-03 ENCOUNTER — LAB ENCOUNTER (OUTPATIENT)
Dept: LAB | Age: 46
End: 2021-11-03
Attending: INTERNAL MEDICINE
Payer: COMMERCIAL

## 2021-11-03 DIAGNOSIS — E05.90 HYPERTHYROIDISM: ICD-10-CM

## 2021-11-03 PROCEDURE — 84443 ASSAY THYROID STIM HORMONE: CPT

## 2021-11-03 PROCEDURE — 84481 FREE ASSAY (FT-3): CPT

## 2021-11-03 PROCEDURE — 36415 COLL VENOUS BLD VENIPUNCTURE: CPT

## 2021-11-03 PROCEDURE — 84439 ASSAY OF FREE THYROXINE: CPT

## 2021-11-03 NOTE — TELEPHONE ENCOUNTER
From: Mode Degree  To: Nancy Charles MD  Sent: 11/2/2021 8:54 PM CDT  Subject: Test     Hi I have an appointment Thursday do I need to [facility] any blood work done before seeing you?

## 2021-11-04 ENCOUNTER — OFFICE VISIT (OUTPATIENT)
Dept: ENDOCRINOLOGY CLINIC | Facility: CLINIC | Age: 46
End: 2021-11-04
Payer: COMMERCIAL

## 2021-11-04 VITALS
WEIGHT: 226 LBS | HEART RATE: 64 BPM | DIASTOLIC BLOOD PRESSURE: 91 MMHG | SYSTOLIC BLOOD PRESSURE: 149 MMHG | BODY MASS INDEX: 31 KG/M2

## 2021-11-04 DIAGNOSIS — E05.90 HYPERTHYROIDISM: Primary | ICD-10-CM

## 2021-11-04 DIAGNOSIS — E78.5 HYPERLIPIDEMIA, UNSPECIFIED HYPERLIPIDEMIA TYPE: ICD-10-CM

## 2021-11-04 PROCEDURE — 99214 OFFICE O/P EST MOD 30 MIN: CPT | Performed by: INTERNAL MEDICINE

## 2021-11-04 PROCEDURE — 3080F DIAST BP >= 90 MM HG: CPT | Performed by: INTERNAL MEDICINE

## 2021-11-04 PROCEDURE — 3077F SYST BP >= 140 MM HG: CPT | Performed by: INTERNAL MEDICINE

## 2021-11-04 NOTE — PROGRESS NOTES
Name: Gabriela Lomeli  Date: 11/4/2021    CC: Hyperthyroidism    HISTORY OF PRESENT ILLNESS   Gabriela Lomeli is a 40year old male who presents for hyperthyroidism.   He was previously maintained on levothyroxine therapy and dose has been significantly dec depression  Skin: normal moisturized skin    Medications:     Current Outpatient Medications:   •  methimazole 5 MG Oral Tab, Take 1 tablet (5 mg total) by mouth daily. , Disp: 90 tablet, Rfl: 1  •  ALPRAZolam (XANAX) 0.25 MG Oral Tab, Take 1 tablet (0.25 m months then evaluate for remission  - He has finished a year of therapy  - Discontinue Methimazole  - Recheck TSH, FT4, FT3 in 6 weeks  - Contact clinic if any palpitations, tremor   - Thyroid US demonstrated inflammation, no nodules   - Further management

## 2021-12-07 ENCOUNTER — APPOINTMENT (OUTPATIENT)
Dept: GENERAL RADIOLOGY | Facility: HOSPITAL | Age: 46
End: 2021-12-07
Payer: COMMERCIAL

## 2021-12-07 ENCOUNTER — HOSPITAL ENCOUNTER (EMERGENCY)
Facility: HOSPITAL | Age: 46
Discharge: HOME | End: 2021-12-07
Payer: COMMERCIAL

## 2021-12-07 ENCOUNTER — TELEPHONE (OUTPATIENT)
Dept: INTERNAL MEDICINE CLINIC | Facility: CLINIC | Age: 46
End: 2021-12-07

## 2021-12-07 VITALS
WEIGHT: 220 LBS | SYSTOLIC BLOOD PRESSURE: 145 MMHG | HEART RATE: 75 BPM | DIASTOLIC BLOOD PRESSURE: 85 MMHG | HEIGHT: 72 IN | RESPIRATION RATE: 18 BRPM | OXYGEN SATURATION: 99 % | TEMPERATURE: 99 F | BODY MASS INDEX: 29.8 KG/M2

## 2021-12-07 DIAGNOSIS — B34.9 VIRAL SYNDROME: Primary | ICD-10-CM

## 2021-12-07 PROCEDURE — 80048 BASIC METABOLIC PNL TOTAL CA: CPT

## 2021-12-07 PROCEDURE — 99284 EMERGENCY DEPT VISIT MOD MDM: CPT

## 2021-12-07 PROCEDURE — 85025 COMPLETE CBC W/AUTO DIFF WBC: CPT

## 2021-12-07 PROCEDURE — 87880 STREP A ASSAY W/OPTIC: CPT

## 2021-12-07 PROCEDURE — 93010 ELECTROCARDIOGRAM REPORT: CPT | Performed by: INTERNAL MEDICINE

## 2021-12-07 PROCEDURE — 93005 ELECTROCARDIOGRAM TRACING: CPT

## 2021-12-07 PROCEDURE — 84484 ASSAY OF TROPONIN QUANT: CPT

## 2021-12-07 PROCEDURE — 36415 COLL VENOUS BLD VENIPUNCTURE: CPT

## 2021-12-07 RX ORDER — CODEINE PHOSPHATE AND GUAIFENESIN 10; 100 MG/5ML; MG/5ML
5 SOLUTION ORAL EVERY 6 HOURS PRN
Qty: 118 ML | Refills: 0 | Status: SHIPPED | OUTPATIENT
Start: 2021-12-07 | End: 2021-12-15

## 2021-12-07 RX ORDER — BENZONATATE 100 MG/1
100 CAPSULE ORAL 3 TIMES DAILY PRN
Qty: 30 CAPSULE | Refills: 0 | Status: SHIPPED | OUTPATIENT
Start: 2021-12-07 | End: 2021-12-15

## 2021-12-07 NOTE — ED PROVIDER NOTES
Patient Seen in: [facility] AND [facility] Emergency Department      History   Patient presents with:  Chest Pain Angina  Abnormal Result    Stated Complaint: Abnormal Ekg, chest Pain    Subjective:   HPI    27-year-old male with history of hyperlipidemia, sent Negative for rash. Neurological: Negative for dizziness. Psychiatric/Behavioral: Negative for suicidal ideas. All other systems reviewed and are negative.       Positive for stated complaint: Abnormal Ekg, chest Pain  Other systems are as noted in HPI RAINBOW DRAW LAVENDER    Collection Time: 12/07/21  3:16 PM   Result Value Ref Range    Hold Lavender Auto Resulted    RAINBOW DRAW LIGHT GREEN    Collection Time: 12/07/21  3:16 PM   Result Value Ref Range    Hold Lt Green Auto Resulted    Basic Metabol Results Available and Reviewed by me while in ED:  No results found. EMERGENCY DEPARTMENT COURSE AND TREATMENT:  Patient's condition was stable during Emergency Department evaluation.      46yoM with cough/chest pain  - I personally reviewed and interp medications    guaiFENesin-codeine (CHERATUSSIN AC) 100-10 MG/5ML Oral Solution  Take 5 mL by mouth every 6 (six) hours as needed for cough., Normal, Disp-118 mL, R-0    benzonatate 100 MG Oral Cap  Take 1 capsule (100 mg total) by mouth 3 (three) times da

## 2021-12-07 NOTE — TELEPHONE ENCOUNTER
Patient's wife Joycelyn Chau is calling   For the last 1 1/2 weeks patient has been experiencing cough, body aches, chest pain when he coughs  His B/P is 130/82 Pulse 97  He has had 2 negative covid tests    Patient is at  now but the wait is 2 hours, should h

## 2021-12-07 NOTE — ED INITIAL ASSESSMENT (HPI)
Patient sent from [address] [place] [address] for abnormal EKG. Patient reports for the last few days he's been feeling unwell with cough, heart palpitations, chest pain, body aches, HA, chills.

## 2021-12-07 NOTE — TELEPHONE ENCOUNTER
Patient's wife is calling patient was seen at [facility]  His EKG was abnormal, he was instructed by doctor at [facility] to go to ER for further testing    They will go to [address] [place] [address] ER  An x-ray was done there was no pneumonia

## 2021-12-07 NOTE — TELEPHONE ENCOUNTER
Spoke to Dr Elizabeth Roberts she instructed patient to remain at [facility] to be seen  Message relayed to patient's wife Shar Garza they will wait to be seen

## 2021-12-15 ENCOUNTER — OFFICE VISIT (OUTPATIENT)
Dept: INTERNAL MEDICINE CLINIC | Facility: CLINIC | Age: 46
End: 2021-12-15
Payer: COMMERCIAL

## 2021-12-15 VITALS
WEIGHT: 223 LBS | DIASTOLIC BLOOD PRESSURE: 80 MMHG | HEART RATE: 63 BPM | OXYGEN SATURATION: 99 % | HEIGHT: 72 IN | BODY MASS INDEX: 30.2 KG/M2 | TEMPERATURE: 98 F | SYSTOLIC BLOOD PRESSURE: 122 MMHG

## 2021-12-15 DIAGNOSIS — Z13.220 SCREENING FOR LIPOID DISORDERS: ICD-10-CM

## 2021-12-15 DIAGNOSIS — R10.13 EPIGASTRIC ABDOMINAL PAIN: ICD-10-CM

## 2021-12-15 DIAGNOSIS — E78.1 HYPERTRIGLYCERIDEMIA: ICD-10-CM

## 2021-12-15 DIAGNOSIS — R42 VERTIGO: ICD-10-CM

## 2021-12-15 DIAGNOSIS — Z00.00 ROUTINE GENERAL MEDICAL EXAMINATION AT A HEALTH CARE FACILITY: Primary | ICD-10-CM

## 2021-12-15 DIAGNOSIS — E55.9 VITAMIN D DEFICIENCY: ICD-10-CM

## 2021-12-15 DIAGNOSIS — K21.9 GASTROESOPHAGEAL REFLUX DISEASE, UNSPECIFIED WHETHER ESOPHAGITIS PRESENT: ICD-10-CM

## 2021-12-15 DIAGNOSIS — R94.31 ABNORMAL EKG: ICD-10-CM

## 2021-12-15 DIAGNOSIS — F41.9 ANXIETY: ICD-10-CM

## 2021-12-15 DIAGNOSIS — Z13.29 SCREENING FOR THYROID DISORDER: ICD-10-CM

## 2021-12-15 DIAGNOSIS — Z13.0 SCREENING FOR IRON DEFICIENCY ANEMIA: ICD-10-CM

## 2021-12-15 DIAGNOSIS — K63.5 POLYP OF COLON, UNSPECIFIED PART OF COLON, UNSPECIFIED TYPE: ICD-10-CM

## 2021-12-15 PROCEDURE — 3079F DIAST BP 80-89 MM HG: CPT | Performed by: INTERNAL MEDICINE

## 2021-12-15 PROCEDURE — 3008F BODY MASS INDEX DOCD: CPT | Performed by: INTERNAL MEDICINE

## 2021-12-15 PROCEDURE — 99396 PREV VISIT EST AGE 40-64: CPT | Performed by: INTERNAL MEDICINE

## 2021-12-15 PROCEDURE — 3074F SYST BP LT 130 MM HG: CPT | Performed by: INTERNAL MEDICINE

## 2021-12-15 RX ORDER — FAMOTIDINE 20 MG/1
20 TABLET ORAL
Qty: 90 TABLET | Refills: 1 | Status: SHIPPED
Start: 2021-12-15

## 2021-12-15 RX ORDER — ALPRAZOLAM 0.25 MG/1
0.25 TABLET ORAL DAILY PRN
Qty: 30 TABLET | Refills: 1 | Status: SHIPPED
Start: 2021-12-15

## 2021-12-15 NOTE — PATIENT INSTRUCTIONS
- You were seen in clinic for regular annual check-up. We have ordered labs for you and we will call you with the results. Please obtain the blood work fasting for least 12 hours, okay to drink water the day of your blood draw.   Okay to get your blood evens

## 2021-12-15 NOTE — H&P
Trevor Hart is a 40year old male.     HPI:   Patient presents with:  Physical: pt here for annual exam and ED follow up      is a 40year old male coming in for annual physical examination    Of note, patient was seen in urgent care on 12/7/ weights.  Has problems with joints, gets pressure of the neck       HISTORY:  Past Medical History:   Diagnosis Date   • Colon adenoma 11/25/2019   • High cholesterol    • Hyperlipidemia    • Hyperthyroidism     Graves Disease   • Hypothyroidism 2006   • Ve Difficulty  Eyes: Eye Pain, Swelling, Redness, Foreign Body, Discharge, Vision Changes  Cardiovascular: Chest Pain, SOB, PND, Dyspnea on Exertion, Orthopnea, Claudication, Edema, Palpitations  Respiratory: Cough, Sputum, Wheezing, Shortness of breath  Marina no clubbing/swelling/edema  Skin: No lesions, No erythema, no jaundice, Cap Refill < 2s  Psychiatric: Appropriate mood and affect  Heme/Lymph/Immune: No cervical LAD      DATA REVIEWED   Labs:  Recent Results (from the past 8760 hour(s))   CBC W/ DIFFERENT results and/or encounters for the requested time period, some results have not been displayed. A complete set of results can be found in Results Review.        Cholesterol, Total (mg/dL)   Date Value   11/01/2020 177     HDL Cholesterol (mg/dL)   Date Value triggering/inciting factors  –In addition fasting blood test as below, would benefit from checking a lipase level as well.   –Stop Tums, start famotidine 20 mg daily, can increase to twice a day if still no improvement  –Colitis seen on CT scan possibly rel Screen: considered low risk  G/C/Syphilis: Considered low risk    Colon Cancer Screening (45-70): Last colonoscopy 2019, due 2024 with Dr. Eva Loja Screening: (50-70):  Not indicated  Lung Cancer Screening (55-79 with 30 p/year and active

## 2021-12-28 ENCOUNTER — TELEPHONE (OUTPATIENT)
Dept: INTERNAL MEDICINE CLINIC | Facility: CLINIC | Age: 46
End: 2021-12-28

## 2021-12-28 DIAGNOSIS — Z20.822 EXPOSURE TO COVID-19 VIRUS: Primary | ICD-10-CM

## 2021-12-28 NOTE — TELEPHONE ENCOUNTER
Patient's wife Lucien Hoyos is calling 3 days ago patient developed a runny nose, head and eyes hurt  She believes he has a sinus infection    Will Dr Boudreaux Ear place an order for a covid test?  Patient's wife is headed to the lab to have a covid test done now    Please call Lucien Bridgererika [phone]

## 2021-12-28 NOTE — TELEPHONE ENCOUNTER
Respiratory infection triage:    Fever:  [x]  No fever  []  Fever>100.4    Cough:  [] Tight cough  [] Cough with exertion  [x] Dry cough  [] Sputum production, Color:    Breathing:  [] Mild shortness of breath interfering with activity  [] Wheezing  [] Pain with deep breathing  [] Using inhaler    Other symptoms:  [] Sore throat  [] Difficulty swallowing  [x] Nasal drainage/congestion - yellow-green  [x] Sinus congestion/pressure  [] Ear pain  [] Body aches  [] Poor appetite  [] Loss of sense of smell   [] Loss of sense of taste  []Conjunctivitis? [] Any recent travel? [x] Any sick contacts? Niece tested positive 12/21. Was not in close contact with her  [] Are you a healthcare worker? ADDITIONAL NOTES:  Spoke to patient's wife Marilyn Mccoy (ok per hipaa). Reports above sx's started about 3 days ago. Denies any SOB, chest discomfort, or fever. Advised covid testing. [facility] covid test ordered per protocol. Advised to quarantine until he receives results and to call back with any worsening sx's. Advised ER evaluation with any red flag signs/sx's. Rani verbalized understanding.      Routed as FYI to Dr. Irlanda Grande per protocol---

## 2022-01-08 ENCOUNTER — LAB ENCOUNTER (OUTPATIENT)
Dept: LAB | Facility: HOSPITAL | Age: 47
End: 2022-01-08
Attending: INTERNAL MEDICINE
Payer: COMMERCIAL

## 2022-01-08 DIAGNOSIS — Z00.00 ROUTINE GENERAL MEDICAL EXAMINATION AT A HEALTH CARE FACILITY: ICD-10-CM

## 2022-01-08 DIAGNOSIS — E78.5 HYPERLIPIDEMIA, UNSPECIFIED HYPERLIPIDEMIA TYPE: ICD-10-CM

## 2022-01-08 DIAGNOSIS — Z13.220 SCREENING FOR LIPOID DISORDERS: ICD-10-CM

## 2022-01-08 DIAGNOSIS — Z13.0 SCREENING FOR IRON DEFICIENCY ANEMIA: ICD-10-CM

## 2022-01-08 DIAGNOSIS — E55.9 VITAMIN D DEFICIENCY: ICD-10-CM

## 2022-01-08 DIAGNOSIS — R10.13 EPIGASTRIC ABDOMINAL PAIN: ICD-10-CM

## 2022-01-08 DIAGNOSIS — E05.90 HYPERTHYROIDISM: ICD-10-CM

## 2022-01-08 LAB
ALBUMIN SERPL-MCNC: 4 G/DL (ref 3.4–5)
ALBUMIN/GLOB SERPL: 1 {RATIO} (ref 1–2)
ALP LIVER SERPL-CCNC: 71 U/L
ALT SERPL-CCNC: 44 U/L
ANION GAP SERPL CALC-SCNC: 6 MMOL/L (ref 0–18)
AST SERPL-CCNC: 24 U/L (ref 15–37)
BASOPHILS # BLD AUTO: 0.04 X10(3) UL (ref 0–0.2)
BASOPHILS NFR BLD AUTO: 0.7 %
BILIRUB SERPL-MCNC: 0.9 MG/DL (ref 0.1–2)
BUN BLD-MCNC: 16 MG/DL (ref 7–18)
BUN/CREAT SERPL: 17.2 (ref 10–20)
CALCIUM BLD-MCNC: 9.3 MG/DL (ref 8.5–10.1)
CHLORIDE SERPL-SCNC: 105 MMOL/L (ref 98–112)
CHOLEST SERPL-MCNC: 246 MG/DL (ref ?–200)
CO2 SERPL-SCNC: 28 MMOL/L (ref 21–32)
CREAT BLD-MCNC: 0.93 MG/DL
DEPRECATED RDW RBC AUTO: 39.8 FL (ref 35.1–46.3)
EOSINOPHIL # BLD AUTO: 0.22 X10(3) UL (ref 0–0.7)
EOSINOPHIL NFR BLD AUTO: 3.9 %
ERYTHROCYTE [DISTWIDTH] IN BLOOD BY AUTOMATED COUNT: 11.7 % (ref 11–15)
FASTING PATIENT LIPID ANSWER: YES
FASTING STATUS PATIENT QL REPORTED: YES
GLOBULIN PLAS-MCNC: 4.1 G/DL (ref 2.8–4.4)
GLUCOSE BLD-MCNC: 103 MG/DL (ref 70–99)
HCT VFR BLD AUTO: 41.8 %
HDLC SERPL-MCNC: 30 MG/DL (ref 40–59)
HGB BLD-MCNC: 14.7 G/DL
IMM GRANULOCYTES # BLD AUTO: 0.03 X10(3) UL (ref 0–1)
IMM GRANULOCYTES NFR BLD: 0.5 %
LDLC SERPL CALC-MCNC: 85 MG/DL (ref ?–100)
LIPASE SERPL-CCNC: 87 U/L (ref 73–393)
LYMPHOCYTES # BLD AUTO: 2.42 X10(3) UL (ref 1–4)
LYMPHOCYTES NFR BLD AUTO: 42.6 %
MCH RBC QN AUTO: 32.5 PG (ref 26–34)
MCHC RBC AUTO-ENTMCNC: 35.2 G/DL (ref 31–37)
MCV RBC AUTO: 92.5 FL
MONOCYTES # BLD AUTO: 0.41 X10(3) UL (ref 0.1–1)
MONOCYTES NFR BLD AUTO: 7.2 %
NEUTROPHILS # BLD AUTO: 2.56 X10 (3) UL (ref 1.5–7.7)
NEUTROPHILS # BLD AUTO: 2.56 X10(3) UL (ref 1.5–7.7)
NEUTROPHILS NFR BLD AUTO: 45.1 %
NONHDLC SERPL-MCNC: 216 MG/DL (ref ?–130)
OSMOLALITY SERPL CALC.SUM OF ELEC: 289 MOSM/KG (ref 275–295)
PLATELET # BLD AUTO: 353 10(3)UL (ref 150–450)
POTASSIUM SERPL-SCNC: 4.1 MMOL/L (ref 3.5–5.1)
PROT SERPL-MCNC: 8.1 G/DL (ref 6.4–8.2)
RBC # BLD AUTO: 4.52 X10(6)UL
SODIUM SERPL-SCNC: 139 MMOL/L (ref 136–145)
T3FREE SERPL-MCNC: 3.33 PG/ML (ref 2.4–4.2)
T4 FREE SERPL-MCNC: 1 NG/DL (ref 0.8–1.7)
TRIGL SERPL-MCNC: 799 MG/DL (ref 30–149)
TSI SER-ACNC: 2.15 MIU/ML (ref 0.36–3.74)
VIT D+METAB SERPL-MCNC: 19.6 NG/ML (ref 30–100)
VLDLC SERPL CALC-MCNC: 134 MG/DL (ref 0–30)
WBC # BLD AUTO: 5.7 X10(3) UL (ref 4–11)

## 2022-01-08 PROCEDURE — 83690 ASSAY OF LIPASE: CPT

## 2022-01-08 PROCEDURE — 80053 COMPREHEN METABOLIC PANEL: CPT

## 2022-01-08 PROCEDURE — 84443 ASSAY THYROID STIM HORMONE: CPT

## 2022-01-08 PROCEDURE — 80061 LIPID PANEL: CPT

## 2022-01-08 PROCEDURE — 82306 VITAMIN D 25 HYDROXY: CPT

## 2022-01-08 PROCEDURE — 84481 FREE ASSAY (FT-3): CPT

## 2022-01-08 PROCEDURE — 36415 COLL VENOUS BLD VENIPUNCTURE: CPT

## 2022-01-08 PROCEDURE — 84439 ASSAY OF FREE THYROXINE: CPT

## 2022-01-08 PROCEDURE — 85025 COMPLETE CBC W/AUTO DIFF WBC: CPT

## 2022-01-10 ENCOUNTER — TELEPHONE (OUTPATIENT)
Dept: ENDOCRINOLOGY CLINIC | Facility: CLINIC | Age: 47
End: 2022-01-10

## 2022-01-10 DIAGNOSIS — E78.5 HYPERLIPIDEMIA, UNSPECIFIED HYPERLIPIDEMIA TYPE: Primary | ICD-10-CM

## 2022-01-10 RX ORDER — FENOFIBRATE 134 MG/1
1 CAPSULE ORAL DAILY
Qty: 90 CAPSULE | Refills: 1 | Status: SHIPPED | OUTPATIENT
Start: 2022-01-10 | End: 2022-02-09

## 2022-01-10 NOTE — TELEPHONE ENCOUNTER
Please call patient - good news, thyroid levels are normal.  However his triglyceride level is significantly elevated. Recommend starting Fenofibrate 134mg PO daily #90, refill 1 and recheck CMP, lipids in 3 months. Thanks.

## 2022-01-10 NOTE — TELEPHONE ENCOUNTER
rn called patient with message by dr Alexandru Dubon, patient verbalized understanding of instructions.   rx sent   Labs ordered

## 2022-01-11 ENCOUNTER — TELEPHONE (OUTPATIENT)
Dept: INTERNAL MEDICINE CLINIC | Facility: CLINIC | Age: 47
End: 2022-01-11

## 2022-01-11 NOTE — TELEPHONE ENCOUNTER
I called the patient regarding his blood test 1/8/2022    CMP: Fasting glucose borderline 103  Lipid panel:  Total cholesterol 246, triglycerides 799, HDL 30, LDL 85  Vitamin D 19.6  Thyroid studies within normal limits  CBC within normal limits    Recommen

## 2022-01-15 ENCOUNTER — HOSPITAL ENCOUNTER (OUTPATIENT)
Dept: CV DIAGNOSTICS | Facility: HOSPITAL | Age: 47
Discharge: HOME | End: 2022-01-15
Attending: INTERNAL MEDICINE
Payer: COMMERCIAL

## 2022-01-15 DIAGNOSIS — R94.31 ABNORMAL EKG: ICD-10-CM

## 2022-01-15 PROCEDURE — 93306 TTE W/DOPPLER COMPLETE: CPT | Performed by: INTERNAL MEDICINE

## 2022-01-18 NOTE — TELEPHONE ENCOUNTER
I reviewed the echocardiogram from 1/15/2022:    EF 70%, normal wall motion without regional wall motion abnormalities, left ventricular diastolic function normal.  Valves seem to be structurally intact. I called the patient with these results.     He is

## 2022-05-04 ENCOUNTER — TELEPHONE (OUTPATIENT)
Dept: ENDOCRINOLOGY CLINIC | Facility: CLINIC | Age: 47
End: 2022-05-04

## 2022-05-04 NOTE — TELEPHONE ENCOUNTER
Dr. Erika Quiñonez,  Patient's wife asking for thyroid lab orders  LOV 11/4/21  F/U 8/8/22    Per TE dtd 1/10/22: recheck CMP, lipids in 3 months    TSH, T4 and T3 orders pended for approval - should patient complete all labs now or closer to f/u on 8/8/22 - please advise -thanks

## 2022-05-05 NOTE — TELEPHONE ENCOUNTER
LM and sent [facility] advising patient and patient's wife that lab orders have been placed

## 2022-07-04 ENCOUNTER — LAB ENCOUNTER (OUTPATIENT)
Dept: LAB | Facility: HOSPITAL | Age: 47
End: 2022-07-04
Attending: INTERNAL MEDICINE
Payer: COMMERCIAL

## 2022-07-04 DIAGNOSIS — E78.5 HYPERLIPIDEMIA, UNSPECIFIED HYPERLIPIDEMIA TYPE: ICD-10-CM

## 2022-07-04 DIAGNOSIS — E05.90 HYPERTHYROIDISM: ICD-10-CM

## 2022-07-04 LAB
ALBUMIN SERPL-MCNC: 4.1 G/DL (ref 3.4–5)
ALBUMIN/GLOB SERPL: 1.1 {RATIO} (ref 1–2)
ALP LIVER SERPL-CCNC: 56 U/L
ALT SERPL-CCNC: 39 U/L
ANION GAP SERPL CALC-SCNC: 5 MMOL/L (ref 0–18)
AST SERPL-CCNC: 30 U/L (ref 15–37)
BILIRUB SERPL-MCNC: 1 MG/DL (ref 0.1–2)
BUN BLD-MCNC: 18 MG/DL (ref 7–18)
BUN/CREAT SERPL: 17.3 (ref 10–20)
CALCIUM BLD-MCNC: 8.9 MG/DL (ref 8.5–10.1)
CHLORIDE SERPL-SCNC: 104 MMOL/L (ref 98–112)
CHOLEST SERPL-MCNC: 203 MG/DL (ref ?–200)
CO2 SERPL-SCNC: 28 MMOL/L (ref 21–32)
CREAT BLD-MCNC: 1.04 MG/DL
FASTING PATIENT LIPID ANSWER: YES
FASTING STATUS PATIENT QL REPORTED: YES
GLOBULIN PLAS-MCNC: 3.8 G/DL (ref 2.8–4.4)
GLUCOSE BLD-MCNC: 101 MG/DL (ref 70–99)
HDLC SERPL-MCNC: 42 MG/DL (ref 40–59)
LDLC SERPL CALC-MCNC: 116 MG/DL (ref ?–100)
NONHDLC SERPL-MCNC: 161 MG/DL (ref ?–130)
OSMOLALITY SERPL CALC.SUM OF ELEC: 286 MOSM/KG (ref 275–295)
POTASSIUM SERPL-SCNC: 3.8 MMOL/L (ref 3.5–5.1)
PROT SERPL-MCNC: 7.9 G/DL (ref 6.4–8.2)
SODIUM SERPL-SCNC: 137 MMOL/L (ref 136–145)
T3FREE SERPL-MCNC: 3.28 PG/ML (ref 2.4–4.2)
T4 FREE SERPL-MCNC: 1 NG/DL (ref 0.8–1.7)
TRIGL SERPL-MCNC: 260 MG/DL (ref 30–149)
TSI SER-ACNC: 2.87 MIU/ML (ref 0.36–3.74)
VLDLC SERPL CALC-MCNC: 46 MG/DL (ref 0–30)

## 2022-07-04 PROCEDURE — 80061 LIPID PANEL: CPT

## 2022-07-04 PROCEDURE — 80053 COMPREHEN METABOLIC PANEL: CPT

## 2022-07-04 PROCEDURE — 84481 FREE ASSAY (FT-3): CPT

## 2022-07-04 PROCEDURE — 84439 ASSAY OF FREE THYROXINE: CPT

## 2022-07-04 PROCEDURE — 36415 COLL VENOUS BLD VENIPUNCTURE: CPT

## 2022-07-04 PROCEDURE — 84443 ASSAY THYROID STIM HORMONE: CPT

## 2022-08-05 ENCOUNTER — LAB ENCOUNTER (OUTPATIENT)
Dept: LAB | Age: 47
End: 2022-08-05
Attending: INTERNAL MEDICINE
Payer: COMMERCIAL

## 2022-08-05 ENCOUNTER — PATIENT MESSAGE (OUTPATIENT)
Dept: INTERNAL MEDICINE CLINIC | Facility: CLINIC | Age: 47
End: 2022-08-05

## 2022-08-05 ENCOUNTER — OFFICE VISIT (OUTPATIENT)
Dept: INTERNAL MEDICINE CLINIC | Facility: CLINIC | Age: 47
End: 2022-08-05
Payer: COMMERCIAL

## 2022-08-05 VITALS
TEMPERATURE: 98 F | HEART RATE: 67 BPM | WEIGHT: 219 LBS | BODY MASS INDEX: 29.66 KG/M2 | HEIGHT: 72 IN | DIASTOLIC BLOOD PRESSURE: 88 MMHG | SYSTOLIC BLOOD PRESSURE: 140 MMHG | OXYGEN SATURATION: 98 %

## 2022-08-05 DIAGNOSIS — E55.9 VITAMIN D DEFICIENCY: ICD-10-CM

## 2022-08-05 DIAGNOSIS — F41.9 ANXIETY: ICD-10-CM

## 2022-08-05 DIAGNOSIS — E78.1 HYPERTRIGLYCERIDEMIA: Primary | ICD-10-CM

## 2022-08-05 DIAGNOSIS — M25.50 ARTHRALGIA, UNSPECIFIED JOINT: ICD-10-CM

## 2022-08-05 DIAGNOSIS — K21.9 GASTROESOPHAGEAL REFLUX DISEASE, UNSPECIFIED WHETHER ESOPHAGITIS PRESENT: ICD-10-CM

## 2022-08-05 DIAGNOSIS — Z84.0 FAMILY HISTORY OF LUPUS ERYTHEMATOSUS: ICD-10-CM

## 2022-08-05 LAB
CRP SERPL-MCNC: <0.29 MG/DL (ref ?–0.3)
ERYTHROCYTE [SEDIMENTATION RATE] IN BLOOD: 8 MM/HR
RHEUMATOID FACT SERPL-ACNC: <10 IU/ML (ref ?–15)
VIT D+METAB SERPL-MCNC: 21 NG/ML (ref 30–100)

## 2022-08-05 PROCEDURE — 3077F SYST BP >= 140 MM HG: CPT | Performed by: INTERNAL MEDICINE

## 2022-08-05 PROCEDURE — 3008F BODY MASS INDEX DOCD: CPT | Performed by: INTERNAL MEDICINE

## 2022-08-05 PROCEDURE — 82306 VITAMIN D 25 HYDROXY: CPT

## 2022-08-05 PROCEDURE — 85652 RBC SED RATE AUTOMATED: CPT

## 2022-08-05 PROCEDURE — 99214 OFFICE O/P EST MOD 30 MIN: CPT | Performed by: INTERNAL MEDICINE

## 2022-08-05 PROCEDURE — 86431 RHEUMATOID FACTOR QUANT: CPT

## 2022-08-05 PROCEDURE — 36415 COLL VENOUS BLD VENIPUNCTURE: CPT

## 2022-08-05 PROCEDURE — 86038 ANTINUCLEAR ANTIBODIES: CPT

## 2022-08-05 PROCEDURE — 86140 C-REACTIVE PROTEIN: CPT

## 2022-08-05 PROCEDURE — 3079F DIAST BP 80-89 MM HG: CPT | Performed by: INTERNAL MEDICINE

## 2022-08-05 PROCEDURE — 86039 ANTINUCLEAR ANTIBODIES (ANA): CPT

## 2022-08-05 RX ORDER — ALPRAZOLAM 0.25 MG/1
0.25 TABLET ORAL DAILY PRN
Qty: 30 TABLET | Refills: 1 | Status: SHIPPED | OUTPATIENT
Start: 2022-08-05

## 2022-08-05 RX ORDER — METHYLPREDNISOLONE 4 MG/1
TABLET ORAL
Qty: 1 EACH | Refills: 0 | Status: SHIPPED | OUTPATIENT
Start: 2022-08-05

## 2022-08-05 NOTE — TELEPHONE ENCOUNTER
From: Fátima Alberts  To: Rancho Rodriguez MD  Sent: 8/5/2022 2:34 PM CDT  Subject: Results    Hi doctor just saw the results came in. Are the results ok?

## 2022-08-08 ENCOUNTER — TELEMEDICINE (OUTPATIENT)
Dept: ENDOCRINOLOGY CLINIC | Facility: CLINIC | Age: 47
End: 2022-08-08
Payer: COMMERCIAL

## 2022-08-08 ENCOUNTER — TELEPHONE (OUTPATIENT)
Dept: ENDOCRINOLOGY CLINIC | Facility: CLINIC | Age: 47
End: 2022-08-08

## 2022-08-08 DIAGNOSIS — E05.90 HYPERTHYROIDISM: Primary | ICD-10-CM

## 2022-08-08 LAB — NUCLEAR IGG TITR SER IF: POSITIVE {TITER}

## 2022-08-08 NOTE — TELEPHONE ENCOUNTER
Pts wife states pt has an appt today at 4pm but would like to know if it can be changed to a video visit due to car problems. Please call.

## 2022-08-09 ENCOUNTER — PATIENT MESSAGE (OUTPATIENT)
Dept: INTERNAL MEDICINE CLINIC | Facility: CLINIC | Age: 47
End: 2022-08-09

## 2022-08-09 DIAGNOSIS — R76.8 POSITIVE ANA (ANTINUCLEAR ANTIBODY): ICD-10-CM

## 2022-08-09 DIAGNOSIS — M25.50 ARTHRALGIA, UNSPECIFIED JOINT: Primary | ICD-10-CM

## 2022-08-09 RX ORDER — CYCLOBENZAPRINE HCL 5 MG
5 TABLET ORAL 3 TIMES DAILY PRN
Qty: 60 TABLET | Refills: 1 | Status: SHIPPED | OUTPATIENT
Start: 2022-08-09

## 2022-08-09 NOTE — TELEPHONE ENCOUNTER
Due to ongoing symptoms, lets go ahead and proceed with the cyclobenzaprine 5 mg. Take first at nighttime to see if this improves her symptoms. Can take 3 times a day, but should be careful with driving or operating machinery. He should notify us if there is any worsening of symptoms over time.

## 2022-08-09 NOTE — TELEPHONE ENCOUNTER
As FYI to DR. LOCKETT -called patient spoke with spouse per hipaa and relayed DR. LOCKETT message - patient has no new SX - just like when he had visit on 8/5/22

## 2022-08-09 NOTE — TELEPHONE ENCOUNTER
Soonest appt w/ Dr Danna Mccormack is 8/30  Pt is not feeling well   Wife requests assistance for pt to been seen sooner    Alcus Clonts can be reached at [phone]

## 2022-08-09 NOTE — TELEPHONE ENCOUNTER
Lets triage the patient. From my clinic visit 8/5, He was having arthralgias for which rheumatology appointment can wait. I would proceed with muscle relaxer medications for his joint/muscle pains as outlined in my mychart message. Lets make sure there are no new symptoms that may require more urgent evaluation.

## 2022-08-10 ENCOUNTER — TELEPHONE (OUTPATIENT)
Dept: INTERNAL MEDICINE CLINIC | Facility: CLINIC | Age: 47
End: 2022-08-10

## 2022-08-10 LAB — ANA NUCLEOLAR TITR SER IF: 160 {TITER}

## 2022-08-10 NOTE — TELEPHONE ENCOUNTER
As FYI to DR. LOCKETT called spouse and relayed DR. LOCEKTT message - she states they have ap with Rheumatologist tomorrow 8/11

## 2022-08-11 ENCOUNTER — HOSPITAL ENCOUNTER (OUTPATIENT)
Dept: GENERAL RADIOLOGY | Age: 47
Discharge: HOME | End: 2022-08-11
Attending: INTERNAL MEDICINE
Payer: COMMERCIAL

## 2022-08-11 ENCOUNTER — OFFICE VISIT (OUTPATIENT)
Dept: RHEUMATOLOGY | Facility: CLINIC | Age: 47
End: 2022-08-11
Payer: COMMERCIAL

## 2022-08-11 ENCOUNTER — LAB ENCOUNTER (OUTPATIENT)
Dept: LAB | Age: 47
End: 2022-08-11
Attending: INTERNAL MEDICINE
Payer: COMMERCIAL

## 2022-08-11 VITALS
SYSTOLIC BLOOD PRESSURE: 137 MMHG | BODY MASS INDEX: 29.66 KG/M2 | WEIGHT: 219 LBS | HEART RATE: 77 BPM | HEIGHT: 72 IN | RESPIRATION RATE: 16 BRPM | DIASTOLIC BLOOD PRESSURE: 87 MMHG

## 2022-08-11 DIAGNOSIS — M79.10 MYALGIA: ICD-10-CM

## 2022-08-11 DIAGNOSIS — K58.9 IRRITABLE BOWEL SYNDROME, UNSPECIFIED TYPE: ICD-10-CM

## 2022-08-11 DIAGNOSIS — R76.8 POSITIVE ANA (ANTINUCLEAR ANTIBODY): ICD-10-CM

## 2022-08-11 DIAGNOSIS — M25.50 POLYARTHRALGIA: ICD-10-CM

## 2022-08-11 DIAGNOSIS — M25.50 POLYARTHRALGIA: Primary | ICD-10-CM

## 2022-08-11 DIAGNOSIS — M54.2 NECK PAIN: ICD-10-CM

## 2022-08-11 LAB
C3 SERPL-MCNC: 96 MG/DL (ref 90–180)
C4 SERPL-MCNC: 16.9 MG/DL (ref 10–40)
CK SERPL-CCNC: 56 U/L
THYROGLOB SERPL-MCNC: >500 U/ML (ref ?–60)
THYROPEROXIDASE AB SERPL-ACNC: 250 U/ML (ref ?–60)

## 2022-08-11 PROCEDURE — 99204 OFFICE O/P NEW MOD 45 MIN: CPT | Performed by: INTERNAL MEDICINE

## 2022-08-11 PROCEDURE — 86200 CCP ANTIBODY: CPT

## 2022-08-11 PROCEDURE — 86235 NUCLEAR ANTIGEN ANTIBODY: CPT

## 2022-08-11 PROCEDURE — 3079F DIAST BP 80-89 MM HG: CPT | Performed by: INTERNAL MEDICINE

## 2022-08-11 PROCEDURE — 86376 MICROSOMAL ANTIBODY EACH: CPT

## 2022-08-11 PROCEDURE — 73030 X-RAY EXAM OF SHOULDER: CPT | Performed by: INTERNAL MEDICINE

## 2022-08-11 PROCEDURE — 82085 ASSAY OF ALDOLASE: CPT

## 2022-08-11 PROCEDURE — 86364 TISS TRNSGLTMNASE EA IG CLAS: CPT

## 2022-08-11 PROCEDURE — 86160 COMPLEMENT ANTIGEN: CPT

## 2022-08-11 PROCEDURE — 86225 DNA ANTIBODY NATIVE: CPT

## 2022-08-11 PROCEDURE — 3008F BODY MASS INDEX DOCD: CPT | Performed by: INTERNAL MEDICINE

## 2022-08-11 PROCEDURE — 82550 ASSAY OF CK (CPK): CPT

## 2022-08-11 PROCEDURE — 36415 COLL VENOUS BLD VENIPUNCTURE: CPT

## 2022-08-11 PROCEDURE — 81374 HLA I TYPING 1 ANTIGEN LR: CPT

## 2022-08-11 PROCEDURE — 83516 IMMUNOASSAY NONANTIBODY: CPT

## 2022-08-11 PROCEDURE — 3075F SYST BP GE 130 - 139MM HG: CPT | Performed by: INTERNAL MEDICINE

## 2022-08-11 PROCEDURE — 86036 ANCA SCREEN EACH ANTIBODY: CPT

## 2022-08-11 PROCEDURE — 86800 THYROGLOBULIN ANTIBODY: CPT

## 2022-08-11 RX ORDER — ERGOCALCIFEROL (VITAMIN D2) 1250 MCG
50000 CAPSULE ORAL WEEKLY
Qty: 12 CAPSULE | Refills: 0 | Status: SHIPPED | OUTPATIENT
Start: 2022-08-11 | End: 2022-09-10

## 2022-08-11 RX ORDER — DICLOFENAC SODIUM 75 MG/1
75 TABLET, DELAYED RELEASE ORAL 2 TIMES DAILY
Qty: 60 TABLET | Refills: 1 | Status: SHIPPED | OUTPATIENT
Start: 2022-08-11

## 2022-08-11 NOTE — PATIENT INSTRUCTIONS
1. Check labs   2. Check bilat shoulder xrays   3. Try diclofenac 75mg twice a day - take with food   4. Stop ibuprofen   5. Return to clinic in 3 weeks.    6.for vit d -  start ergocalciferol 50,000units a week x 12 weeks

## 2022-08-12 LAB
CCP IGG SERPL-ACNC: 1.1 U/ML (ref 0–6.9)
DSDNA AB TITR SER: <10 {TITER}
TTG IGA SER-ACNC: 0.5 U/ML (ref ?–7)

## 2022-08-13 LAB — ALDOLASE, SERUM: 2.4 U/L

## 2022-08-14 LAB
HLA-B27: NEGATIVE
SCLERODERMA (SCL-70) (ENA) AB, IGG: 1 AU/ML

## 2022-08-15 LAB — HISTONE AB, IGG: 1.6 UNITS

## 2022-08-16 LAB
MYELOPEROX ANTIBODIES, IGG: 0 AU/ML
SERINE PROTEASE 3, IGG: 6 AU/ML

## 2022-08-17 LAB
ENA SM IGG SER QL: NEGATIVE
ENA SM+RNP AB SER QL: NEGATIVE
ENA SS-A AB SER QL IA: NEGATIVE
ENA SS-B AB SER QL IA: NEGATIVE

## 2022-10-24 ENCOUNTER — PATIENT MESSAGE (OUTPATIENT)
Dept: INTERNAL MEDICINE CLINIC | Facility: CLINIC | Age: 47
End: 2022-10-24

## 2022-10-24 DIAGNOSIS — I65.29 STENOSIS OF CAROTID ARTERY, UNSPECIFIED LATERALITY: Primary | ICD-10-CM

## 2022-10-24 DIAGNOSIS — E78.2 MIXED HYPERLIPIDEMIA: ICD-10-CM

## 2022-10-27 ENCOUNTER — OFFICE VISIT (OUTPATIENT)
Dept: RHEUMATOLOGY | Facility: CLINIC | Age: 47
End: 2022-10-27
Payer: COMMERCIAL

## 2022-10-27 VITALS
RESPIRATION RATE: 16 BRPM | HEIGHT: 72 IN | BODY MASS INDEX: 29.39 KG/M2 | SYSTOLIC BLOOD PRESSURE: 138 MMHG | HEART RATE: 64 BPM | DIASTOLIC BLOOD PRESSURE: 84 MMHG | WEIGHT: 217 LBS

## 2022-10-27 DIAGNOSIS — E55.9 VITAMIN D DEFICIENCY: ICD-10-CM

## 2022-10-27 DIAGNOSIS — M79.10 MYALGIA: ICD-10-CM

## 2022-10-27 DIAGNOSIS — R76.8 HISTONE ANTIBODY POSITIVE: ICD-10-CM

## 2022-10-27 DIAGNOSIS — R76.8 POSITIVE ANA (ANTINUCLEAR ANTIBODY): Primary | ICD-10-CM

## 2022-10-27 PROCEDURE — 3079F DIAST BP 80-89 MM HG: CPT | Performed by: INTERNAL MEDICINE

## 2022-10-27 PROCEDURE — 99214 OFFICE O/P EST MOD 30 MIN: CPT | Performed by: INTERNAL MEDICINE

## 2022-10-27 PROCEDURE — 3008F BODY MASS INDEX DOCD: CPT | Performed by: INTERNAL MEDICINE

## 2022-10-27 PROCEDURE — 3075F SYST BP GE 130 - 139MM HG: CPT | Performed by: INTERNAL MEDICINE

## 2022-10-27 NOTE — PATIENT INSTRUCTIONS
1. Check antihistone abs  In 3months. 2. Cont. Vit d supplementation - check vit d in 3 months.    3. finsihe ergocalciferol 50,000units a week x 12 weeks

## 2022-10-30 NOTE — TELEPHONE ENCOUNTER
I reviewed the outside hospital blood counts: Glucose 104, triglycerides 489, total cholesterol 211, HDL 37  PSA, A1c, CBC, rest of CMP otherwise unremarkable    ASCVD: 4.2%    Order for carotid duplex placed

## 2022-11-28 ENCOUNTER — HOSPITAL ENCOUNTER (OUTPATIENT)
Dept: ULTRASOUND IMAGING | Facility: HOSPITAL | Age: 47
Discharge: HOME | End: 2022-11-28
Attending: INTERNAL MEDICINE
Payer: COMMERCIAL

## 2022-11-28 DIAGNOSIS — I65.29 STENOSIS OF CAROTID ARTERY, UNSPECIFIED LATERALITY: ICD-10-CM

## 2022-11-28 DIAGNOSIS — E78.2 MIXED HYPERLIPIDEMIA: ICD-10-CM

## 2022-11-28 PROCEDURE — 93880 EXTRACRANIAL BILAT STUDY: CPT | Performed by: INTERNAL MEDICINE

## 2022-12-08 ENCOUNTER — PATIENT MESSAGE (OUTPATIENT)
Dept: INTERNAL MEDICINE CLINIC | Facility: CLINIC | Age: 47
End: 2022-12-08

## 2022-12-09 NOTE — TELEPHONE ENCOUNTER
From: Homa Morning  To: Elda Hart MD  Sent: 12/8/2022 7:21 PM CST  Subject: Results    Hi doctor was my ultrasound exam normal?

## 2022-12-12 ENCOUNTER — PATIENT MESSAGE (OUTPATIENT)
Dept: INTERNAL MEDICINE CLINIC | Facility: CLINIC | Age: 47
End: 2022-12-12

## 2023-01-11 NOTE — PATIENT INSTRUCTIONS
- You were seen in clinic for regular annual check-up. We have ordered labs for you and we will call you with the results. Please obtain the bloodwork fasting for 12 hours. OK to drink water the day of your blood draw  -The focus today is the concern of the heartburn as well as your night sweats. We need to rule out cause for infection for which we will send for a stool test, urine test, and check some general blood work    The stool test is checking for bacteria called H. pylori to rule out both night sweats, and the worsening of heartburn  - If these tests are negative, we will proceed with antiacid medication as the next step    We also discussed that anxiety could be a cause of your symptoms  - You may proceed with alprazolam 0.25 mg especially at nighttime to assist you with sleep and anxiety symptoms  - This may be improving your night sweats, and anxiety certainly is on our list of diagnoses that are possible    -We did review your recent rheumatological work-up with Dr. Nellie Whaley. You may have an overlap connective tissue disease for which symptoms have improved with rheumatology management.     -Your next colonoscopy will be due 2024 with Dr. Jose Carrero  - Please continue following up with endocrinology, Dr. Karlee Arriaga  - Vaccines you are due for: Tetanus shot, COVID dose #3, flu shot  - Please continue to eat a varied diet including recommended servings of vegetables, fruits, and low fat dairy. Minimize high saturated fats (such as fast foods) and high sugar intake (such as soda)  - We recommend 150 minutes of moderate intensity exercise (brisk walking, swimming) weekly to maintain your current weight. Targeted weight loss will require more vigorous exercise or more than 150 minutes/week.     Return to clinic in 3-6 months for follow-up

## 2023-01-12 ENCOUNTER — OFFICE VISIT (OUTPATIENT)
Dept: INTERNAL MEDICINE CLINIC | Facility: CLINIC | Age: 48
End: 2023-01-12

## 2023-01-12 VITALS
SYSTOLIC BLOOD PRESSURE: 126 MMHG | DIASTOLIC BLOOD PRESSURE: 84 MMHG | WEIGHT: 220 LBS | OXYGEN SATURATION: 96 % | HEART RATE: 73 BPM | TEMPERATURE: 98 F | BODY MASS INDEX: 29.8 KG/M2 | HEIGHT: 72 IN

## 2023-01-12 DIAGNOSIS — Z13.0 SCREENING FOR DEFICIENCY ANEMIA: ICD-10-CM

## 2023-01-12 DIAGNOSIS — E78.2 MIXED HYPERLIPIDEMIA: ICD-10-CM

## 2023-01-12 DIAGNOSIS — M25.50 ARTHRALGIA, UNSPECIFIED JOINT: ICD-10-CM

## 2023-01-12 DIAGNOSIS — E05.00 GRAVES DISEASE: ICD-10-CM

## 2023-01-12 DIAGNOSIS — Z00.00 ANNUAL PHYSICAL EXAM: Primary | ICD-10-CM

## 2023-01-12 DIAGNOSIS — R76.8 POSITIVE ANA (ANTINUCLEAR ANTIBODY): ICD-10-CM

## 2023-01-12 DIAGNOSIS — K63.5 POLYP OF COLON, UNSPECIFIED PART OF COLON, UNSPECIFIED TYPE: ICD-10-CM

## 2023-01-12 DIAGNOSIS — E78.1 HYPERTRIGLYCERIDEMIA: ICD-10-CM

## 2023-01-12 DIAGNOSIS — K21.9 GASTROESOPHAGEAL REFLUX DISEASE, UNSPECIFIED WHETHER ESOPHAGITIS PRESENT: ICD-10-CM

## 2023-01-12 DIAGNOSIS — Z84.0 FAMILY HISTORY OF LUPUS ERYTHEMATOSUS: ICD-10-CM

## 2023-01-12 DIAGNOSIS — E55.9 VITAMIN D DEFICIENCY: ICD-10-CM

## 2023-01-12 DIAGNOSIS — F41.9 ANXIETY: ICD-10-CM

## 2023-01-12 DIAGNOSIS — R61 NIGHT SWEATS: ICD-10-CM

## 2023-01-12 PROCEDURE — 3008F BODY MASS INDEX DOCD: CPT | Performed by: INTERNAL MEDICINE

## 2023-01-12 PROCEDURE — 3074F SYST BP LT 130 MM HG: CPT | Performed by: INTERNAL MEDICINE

## 2023-01-12 PROCEDURE — 3079F DIAST BP 80-89 MM HG: CPT | Performed by: INTERNAL MEDICINE

## 2023-01-12 PROCEDURE — 99396 PREV VISIT EST AGE 40-64: CPT | Performed by: INTERNAL MEDICINE

## 2023-01-12 RX ORDER — ALPRAZOLAM 0.25 MG/1
0.25 TABLET ORAL DAILY PRN
Qty: 30 TABLET | Refills: 1 | Status: SHIPPED | OUTPATIENT
Start: 2023-01-12

## 2023-01-14 ENCOUNTER — APPOINTMENT (OUTPATIENT)
Dept: LAB | Age: 48
End: 2023-01-14
Attending: INTERNAL MEDICINE
Payer: COMMERCIAL

## 2023-01-14 ENCOUNTER — LAB ENCOUNTER (OUTPATIENT)
Dept: LAB | Facility: HOSPITAL | Age: 48
End: 2023-01-14
Attending: INTERNAL MEDICINE
Payer: COMMERCIAL

## 2023-01-14 DIAGNOSIS — M25.50 ARTHRALGIA, UNSPECIFIED JOINT: ICD-10-CM

## 2023-01-14 DIAGNOSIS — E05.00 GRAVES DISEASE: ICD-10-CM

## 2023-01-14 DIAGNOSIS — E78.1 HYPERTRIGLYCERIDEMIA: ICD-10-CM

## 2023-01-14 DIAGNOSIS — R61 NIGHT SWEATS: ICD-10-CM

## 2023-01-14 DIAGNOSIS — E05.90 HYPERTHYROIDISM: ICD-10-CM

## 2023-01-14 DIAGNOSIS — R76.8 HISTONE ANTIBODY POSITIVE: ICD-10-CM

## 2023-01-14 DIAGNOSIS — K21.9 GASTROESOPHAGEAL REFLUX DISEASE, UNSPECIFIED WHETHER ESOPHAGITIS PRESENT: ICD-10-CM

## 2023-01-14 DIAGNOSIS — Z00.00 ANNUAL PHYSICAL EXAM: ICD-10-CM

## 2023-01-14 DIAGNOSIS — E55.9 VITAMIN D DEFICIENCY: ICD-10-CM

## 2023-01-14 DIAGNOSIS — E78.2 MIXED HYPERLIPIDEMIA: ICD-10-CM

## 2023-01-14 DIAGNOSIS — Z13.0 SCREENING FOR DEFICIENCY ANEMIA: ICD-10-CM

## 2023-01-14 DIAGNOSIS — R76.8 POSITIVE ANA (ANTINUCLEAR ANTIBODY): ICD-10-CM

## 2023-01-14 LAB
ALBUMIN SERPL-MCNC: 4.1 G/DL (ref 3.4–5)
ALBUMIN/GLOB SERPL: 1 {RATIO} (ref 1–2)
ALP LIVER SERPL-CCNC: 53 U/L
ALT SERPL-CCNC: 27 U/L
ANION GAP SERPL CALC-SCNC: 6 MMOL/L (ref 0–18)
AST SERPL-CCNC: 19 U/L (ref 15–37)
BASOPHILS # BLD AUTO: 0.04 X10(3) UL (ref 0–0.2)
BASOPHILS NFR BLD AUTO: 0.8 %
BILIRUB SERPL-MCNC: 0.7 MG/DL (ref 0.1–2)
BILIRUB UR QL: NEGATIVE
BUN BLD-MCNC: 17 MG/DL (ref 7–18)
BUN/CREAT SERPL: 15.6 (ref 10–20)
CALCIUM BLD-MCNC: 9.5 MG/DL (ref 8.5–10.1)
CHLORIDE SERPL-SCNC: 105 MMOL/L (ref 98–112)
CHOLEST SERPL-MCNC: 180 MG/DL (ref ?–200)
CLARITY UR: CLEAR
CO2 SERPL-SCNC: 28 MMOL/L (ref 21–32)
COLOR UR: YELLOW
CREAT BLD-MCNC: 1.09 MG/DL
DEPRECATED RDW RBC AUTO: 38.9 FL (ref 35.1–46.3)
EOSINOPHIL # BLD AUTO: 0.15 X10(3) UL (ref 0–0.7)
EOSINOPHIL NFR BLD AUTO: 3.2 %
ERYTHROCYTE [DISTWIDTH] IN BLOOD BY AUTOMATED COUNT: 11.5 % (ref 11–15)
FASTING PATIENT LIPID ANSWER: YES
FASTING STATUS PATIENT QL REPORTED: YES
GFR SERPLBLD BASED ON 1.73 SQ M-ARVRAT: 84 ML/MIN/1.73M2 (ref 60–?)
GLOBULIN PLAS-MCNC: 4 G/DL (ref 2.8–4.4)
GLUCOSE BLD-MCNC: 105 MG/DL (ref 70–99)
GLUCOSE UR-MCNC: NEGATIVE MG/DL
HCT VFR BLD AUTO: 41.6 %
HDLC SERPL-MCNC: 39 MG/DL (ref 40–59)
HGB BLD-MCNC: 14.5 G/DL
HGB UR QL STRIP.AUTO: NEGATIVE
IMM GRANULOCYTES # BLD AUTO: 0.01 X10(3) UL (ref 0–1)
IMM GRANULOCYTES NFR BLD: 0.2 %
KETONES UR-MCNC: NEGATIVE MG/DL
LDLC SERPL CALC-MCNC: 94 MG/DL (ref ?–100)
LEUKOCYTE ESTERASE UR QL STRIP.AUTO: NEGATIVE
LYMPHOCYTES # BLD AUTO: 1.64 X10(3) UL (ref 1–4)
LYMPHOCYTES NFR BLD AUTO: 34.8 %
MCH RBC QN AUTO: 32 PG (ref 26–34)
MCHC RBC AUTO-ENTMCNC: 34.9 G/DL (ref 31–37)
MCV RBC AUTO: 91.8 FL
MONOCYTES # BLD AUTO: 0.29 X10(3) UL (ref 0.1–1)
MONOCYTES NFR BLD AUTO: 6.2 %
NEUTROPHILS # BLD AUTO: 2.58 X10 (3) UL (ref 1.5–7.7)
NEUTROPHILS # BLD AUTO: 2.58 X10(3) UL (ref 1.5–7.7)
NEUTROPHILS NFR BLD AUTO: 54.8 %
NITRITE UR QL STRIP.AUTO: NEGATIVE
NONHDLC SERPL-MCNC: 141 MG/DL (ref ?–130)
OSMOLALITY SERPL CALC.SUM OF ELEC: 290 MOSM/KG (ref 275–295)
PH UR: 7.5 [PH] (ref 5–8)
PLATELET # BLD AUTO: 332 10(3)UL (ref 150–450)
POTASSIUM SERPL-SCNC: 4 MMOL/L (ref 3.5–5.1)
PROT SERPL-MCNC: 8.1 G/DL (ref 6.4–8.2)
PROT UR-MCNC: NEGATIVE MG/DL
RBC # BLD AUTO: 4.53 X10(6)UL
SODIUM SERPL-SCNC: 139 MMOL/L (ref 136–145)
SP GR UR STRIP: 1.02 (ref 1–1.03)
T3FREE SERPL-MCNC: 3.15 PG/ML (ref 2.4–4.2)
T4 FREE SERPL-MCNC: 1 NG/DL (ref 0.8–1.7)
TRIGL SERPL-MCNC: 282 MG/DL (ref 30–149)
TSI SER-ACNC: 3.19 MIU/ML (ref 0.36–3.74)
URATE SERPL-MCNC: 6.2 MG/DL
UROBILINOGEN UR STRIP-ACNC: 0.2
VIT D+METAB SERPL-MCNC: 44.7 NG/ML (ref 30–100)
VLDLC SERPL CALC-MCNC: 47 MG/DL (ref 0–30)
WBC # BLD AUTO: 4.7 X10(3) UL (ref 4–11)

## 2023-01-14 PROCEDURE — 86235 NUCLEAR ANTIGEN ANTIBODY: CPT

## 2023-01-14 PROCEDURE — 87338 HPYLORI STOOL AG IA: CPT

## 2023-01-14 PROCEDURE — 80061 LIPID PANEL: CPT

## 2023-01-14 PROCEDURE — 81003 URINALYSIS AUTO W/O SCOPE: CPT

## 2023-01-14 PROCEDURE — 36415 COLL VENOUS BLD VENIPUNCTURE: CPT

## 2023-01-14 PROCEDURE — 82306 VITAMIN D 25 HYDROXY: CPT

## 2023-01-14 PROCEDURE — 84439 ASSAY OF FREE THYROXINE: CPT

## 2023-01-14 PROCEDURE — 84550 ASSAY OF BLOOD/URIC ACID: CPT

## 2023-01-14 PROCEDURE — 86480 TB TEST CELL IMMUN MEASURE: CPT

## 2023-01-14 PROCEDURE — 85025 COMPLETE CBC W/AUTO DIFF WBC: CPT

## 2023-01-14 PROCEDURE — 84443 ASSAY THYROID STIM HORMONE: CPT

## 2023-01-14 PROCEDURE — 80053 COMPREHEN METABOLIC PANEL: CPT

## 2023-01-14 PROCEDURE — 84481 FREE ASSAY (FT-3): CPT

## 2023-01-16 LAB
HISTONE AB, IGG: 1.5 UNITS
M TB IFN-G CD4+ T-CELLS BLD-ACNC: 0.01 IU/ML
M TB TUBERC IFN-G BLD QL: NEGATIVE
M TB TUBERC IGNF/MITOGEN IGNF CONTROL: >10 IU/ML
QFT TB1 AG MINUS NIL: 0 IU/ML
QFT TB2 AG MINUS NIL: 0 IU/ML

## 2023-01-17 ENCOUNTER — PATIENT MESSAGE (OUTPATIENT)
Dept: INTERNAL MEDICINE CLINIC | Facility: CLINIC | Age: 48
End: 2023-01-17

## 2023-01-17 ENCOUNTER — PATIENT MESSAGE (OUTPATIENT)
Dept: RHEUMATOLOGY | Facility: CLINIC | Age: 48
End: 2023-01-17

## 2023-01-17 DIAGNOSIS — R76.8 POSITIVE ANA (ANTINUCLEAR ANTIBODY): Primary | ICD-10-CM

## 2023-01-17 LAB — H PYLORI AG STL QL IA: NEGATIVE

## 2023-01-17 NOTE — TELEPHONE ENCOUNTER
From: Fátima Alberts  To: Rancho Rodriguez MD  Sent: 1/17/2023 5:20 PM CST  Subject: Question regarding COMP METABOLIC PANEL (14)    Hi good evening were you able to review my all my results?

## 2023-01-18 NOTE — TELEPHONE ENCOUNTER
From: Elton Dixon  To: Brigida Vincent MD  Sent: 1/17/2023 5:19 PM CST  Subject: Question regarding ANTIHISTONE ANTIBODIES    Are my blood results normal?

## 2023-01-18 NOTE — TELEPHONE ENCOUNTER
Please see pt's message below and advise.      Component      Latest Ref Rng & Units 1/14/2023   Histone Ab, IgG      0.0 - 0.9 Units 1.5 (H)   VITAMIN D, 25-OH, TOTAL      30.0 - 100.0 ng/mL 44.7

## 2023-01-20 RX ORDER — NICOTINE POLACRILEX 4 MG/1
20 GUM, CHEWING ORAL DAILY PRN
Qty: 90 TABLET | Refills: 1 | Status: SHIPPED | OUTPATIENT
Start: 2023-01-20 | End: 2023-02-19

## 2023-01-20 NOTE — TELEPHONE ENCOUNTER
CMP: glucose 105  CHolesterol has improved (ASCVD 2.8%) , HDL 39  H pylori, Quant  Vitamin D improved    Recommendations  Proceed with PPI therapy for acid reflux  We can monitor the night sweats for now, if no improvement we could consider glycopyrrolate

## 2023-02-09 ENCOUNTER — TELEPHONE (OUTPATIENT)
Facility: CLINIC | Age: 48
End: 2023-02-09

## 2023-02-09 DIAGNOSIS — R10.30 LOWER ABDOMINAL PAIN: Primary | ICD-10-CM

## 2023-02-09 NOTE — TELEPHONE ENCOUNTER
Dr. Amy Her,    I spoke to patient's wife who states patient is having similar symptoms of lower abdominal pain as he has had in the past. It is a cramping dull pain that is consistent throughout the day. Started up again about 2-3 weeks ago. Denies constipation/diarrhea, vomiting/nausea. Patient not seen since 2020. Did see pcp recently and completed blood/stool test.     Do you want to see patient sooner in the office and/or have any imaging or tests done?

## 2023-02-10 ENCOUNTER — HOSPITAL ENCOUNTER (OUTPATIENT)
Dept: GENERAL RADIOLOGY | Age: 48
Discharge: HOME | End: 2023-02-10
Attending: INTERNAL MEDICINE
Payer: COMMERCIAL

## 2023-02-10 DIAGNOSIS — R10.30 LOWER ABDOMINAL PAIN: ICD-10-CM

## 2023-02-10 PROCEDURE — 74018 RADEX ABDOMEN 1 VIEW: CPT | Performed by: INTERNAL MEDICINE

## 2023-02-10 RX ORDER — DICYCLOMINE HYDROCHLORIDE 10 MG/1
10 CAPSULE ORAL 3 TIMES DAILY PRN
Qty: 90 CAPSULE | Refills: 0 | Status: SHIPPED | OUTPATIENT
Start: 2023-02-10

## 2023-02-10 NOTE — TELEPHONE ENCOUNTER
Spoke to patients wife, and reviewed message below. She verbalized understanding. Dr. Reji Luevano,    Please send in dicyclomine, thank you.

## 2023-02-10 NOTE — TELEPHONE ENCOUNTER
I see the labs. I reviewed last note. I would recommend dicyclomine. I've ordered an abdominal x ray.  If pain becomes severe he should present to the ED    Thanks    Isatu Washington MD  [address] - Gastroenterology  2/10/2023  12:23 PM

## 2023-02-13 ENCOUNTER — PATIENT MESSAGE (OUTPATIENT)
Dept: GASTROENTEROLOGY | Facility: CLINIC | Age: 48
End: 2023-02-13

## 2023-02-14 NOTE — TELEPHONE ENCOUNTER
From: Fátima Alberts  To: Pepper Allen MD  Sent: 2/13/2023 5:10 PM CST  Subject: Question regarding XR ABDOMEN (1 VIEW) (CPT=74018)    Good afternoon what was the results of the X-ray.  Still having lower abdominal discomfort

## 2023-02-14 NOTE — TELEPHONE ENCOUNTER
The Xray shows a lot of stool in the colon. I recommend a washout which would be taking a bowel preparation similar to that for colonoscopy to see if that helps with the symptoms. Can use the dicyclomine still.  If he is agreeable will send instructions    Thanks    Pepper Allen MD  [address] - Gastroenterology  2/14/2023  10:49 AM

## 2023-02-14 NOTE — TELEPHONE ENCOUNTER
Will send golytely please advise as below. WASHOUT INSTRUCTIONS:  1. Pick a day you will be at home, close to a toilet. 2. Have a light breakfast that AM: small amount of eggs or 1 slice of toast  3. Around 10AM start drinking the solution prescribed (for Moviprep or Trilyte/colyte/golytely), drink over the course of 3-5 hours). IF having nausea/bloating/disention, take a break for 30 minutes, walk around and then resume drinking. If vomiting, take a break for 1 hour, if symptoms improve, and you feel well, can resume drinking. 4. Goal is to finish solution or until stools turn liquid yellow. 5. For lunch you should have a liquid diet (7up, water, gingerale, etc)  6. After prep, for dinner you can have a light dinner. 7. Resume regular meals the following day, along with laxative medications. 8. You should continue your regular medications during the washout day.        After above, recommend a regular fiber supplement like metamucil    Thanks    Escobar Mckeon MD  [address] - Gastroenterology  2/14/2023  3:12 PM

## 2023-05-11 ENCOUNTER — TELEPHONE (OUTPATIENT)
Dept: GASTROENTEROLOGY | Facility: CLINIC | Age: 48
End: 2023-05-11

## 2023-05-11 ENCOUNTER — OFFICE VISIT (OUTPATIENT)
Dept: GASTROENTEROLOGY | Facility: CLINIC | Age: 48
End: 2023-05-11

## 2023-05-11 VITALS
WEIGHT: 216.81 LBS | BODY MASS INDEX: 29.36 KG/M2 | DIASTOLIC BLOOD PRESSURE: 88 MMHG | SYSTOLIC BLOOD PRESSURE: 133 MMHG | HEART RATE: 72 BPM | HEIGHT: 72 IN

## 2023-05-11 DIAGNOSIS — Z12.11 SCREENING FOR COLORECTAL CANCER: Primary | ICD-10-CM

## 2023-05-11 DIAGNOSIS — R10.9 ABDOMINAL PAIN, UNSPECIFIED ABDOMINAL LOCATION: ICD-10-CM

## 2023-05-11 DIAGNOSIS — Z80.0 FAMILY HISTORY OF COLON CANCER: ICD-10-CM

## 2023-05-11 DIAGNOSIS — Z12.12 SCREENING FOR COLORECTAL CANCER: Primary | ICD-10-CM

## 2023-05-11 DIAGNOSIS — Z86.010 HISTORY OF ADENOMATOUS POLYP OF COLON: ICD-10-CM

## 2023-05-11 PROCEDURE — 99214 OFFICE O/P EST MOD 30 MIN: CPT | Performed by: INTERNAL MEDICINE

## 2023-05-11 PROCEDURE — 3008F BODY MASS INDEX DOCD: CPT | Performed by: INTERNAL MEDICINE

## 2023-05-11 PROCEDURE — 3079F DIAST BP 80-89 MM HG: CPT | Performed by: INTERNAL MEDICINE

## 2023-05-11 PROCEDURE — 3075F SYST BP GE 130 - 139MM HG: CPT | Performed by: INTERNAL MEDICINE

## 2023-05-11 NOTE — PATIENT INSTRUCTIONS
Colonoscopy  1. Schedule colonoscopy with monitored anesthesia care (MAC) at [facility] [dx: colorectal cancer screening, history of adenomatous colon polyp, family history of colon cancer]    2.  bowel prep from pharmacy (split trilyte or golytely). As we discussed it is important to take the bowel preparation in two parts taking 2L of the liquid the night before the procedure and the second 2L the morning of the procedure starting approximately 6 hours prior to your scheduled procedure time. 3. Continue all medications for procedure    4. Read all bowel prep instructions carefully    5. AVOID seeds, nuts, popcorn, raw fruits and vegetables (cooked is okay) for 2-3 days before procedure    >>>Please note: if you were prescribed a bowel prep and it is too expensive or not covered by insurance, it is okay to substitute Trilyte or Golytely (or any similar generic prep). This can be done by notifying the pharmacy or calling our office.      For your irritable bowel syndrome  Increase fiber in the diet  Take dicyclomine as needed for abdominal discomfort

## 2023-05-11 NOTE — TELEPHONE ENCOUNTER
Scheduled for:  Colonoscopy [address] , [facility]   Provider Name:  Sil Tilley  Date:  8/11/2023  Location: [facility]  Sedation:  Mac   Time:  0945 Am (pt is aware to arrive at 0845 Am )   Prep: Split dose Golytely or Trilyte  Prep instructions were given to pt in the office, I discuss prep Instructions with patient at the time of the appointment which he verbally understood and given the prep instructions at the time of the appointment  Meds/Allergies Reconciled?:  Physician reviewed   Diagnosis with codes: Colorectal cancer screening Z12.11,Z12.12, history of adenomatous colon polyps Z86.010 ,family history of colon cancer Z80.0   Was patient informed to call insurance with codes (Y/N):  Yes, I confirmed [employer] [place] insurance with the patient. The patient also verbally understands to call his insurance to check for pre-cert, codes were given on prep instructions. Referral sent?:  Referral was sent at the time of electronic surgical scheduling. [facility] or [facility] [address]  notified?:  I sent an electronic request to Endo Scheduling and received a confirmation today. Medication Orders: This patient verbally confirmed that he is not taking:   Iron, blood thinners, BP meds, and is not diabetic   Not latex allergy, Not PCN allergy and does not have a pacemaker Pt is aware to NOT take iron pills, herbal meds and diet supplements for 7 days before exam. Also to NOT take any form of alcohol, recreational drugs and any forms of ED meds 24 hours before exam.    Misc Orders:  Patient verbally understood & will await a phone call from [facility] to schedule.       Further instructions given by staff:

## 2023-08-09 NOTE — PAT NURSING NOTE
Verified information with patient's wife Kristen Isabel, patient name and  verified. [place] address and arrival time confirmed with Kristen Isabel. All questions answered.

## 2023-08-11 ENCOUNTER — TELEPHONE (OUTPATIENT)
Dept: GASTROENTEROLOGY | Facility: CLINIC | Age: 48
End: 2023-08-11

## 2023-08-11 ENCOUNTER — ANESTHESIA EVENT (OUTPATIENT)
Dept: ENDOSCOPY | Age: 48
End: 2023-08-11
Payer: COMMERCIAL

## 2023-08-11 ENCOUNTER — HOSPITAL ENCOUNTER (OUTPATIENT)
Age: 48
Setting detail: HOSPITAL OUTPATIENT SURGERY
Discharge: HOME | End: 2023-08-11
Attending: INTERNAL MEDICINE | Admitting: INTERNAL MEDICINE
Payer: COMMERCIAL

## 2023-08-11 ENCOUNTER — ANESTHESIA (OUTPATIENT)
Dept: ENDOSCOPY | Age: 48
End: 2023-08-11
Payer: COMMERCIAL

## 2023-08-11 VITALS
BODY MASS INDEX: 29.12 KG/M2 | HEART RATE: 67 BPM | SYSTOLIC BLOOD PRESSURE: 116 MMHG | RESPIRATION RATE: 19 BRPM | HEIGHT: 72 IN | WEIGHT: 215 LBS | OXYGEN SATURATION: 98 % | DIASTOLIC BLOOD PRESSURE: 79 MMHG

## 2023-08-11 DIAGNOSIS — Z12.12 SCREENING FOR COLORECTAL CANCER: ICD-10-CM

## 2023-08-11 DIAGNOSIS — Z80.0 FAMILY HISTORY OF COLON CANCER: ICD-10-CM

## 2023-08-11 DIAGNOSIS — Z12.11 SCREENING FOR COLORECTAL CANCER: ICD-10-CM

## 2023-08-11 DIAGNOSIS — Z86.010 HISTORY OF ADENOMATOUS POLYP OF COLON: ICD-10-CM

## 2023-08-11 PROCEDURE — 45378 DIAGNOSTIC COLONOSCOPY: CPT | Performed by: INTERNAL MEDICINE

## 2023-08-11 RX ORDER — SODIUM CHLORIDE, SODIUM LACTATE, POTASSIUM CHLORIDE, CALCIUM CHLORIDE 600; 310; 30; 20 MG/100ML; MG/100ML; MG/100ML; MG/100ML
INJECTION, SOLUTION INTRAVENOUS CONTINUOUS
Status: DISCONTINUED | OUTPATIENT
Start: 2023-08-11 | End: 2023-08-11

## 2023-08-11 RX ORDER — LIDOCAINE HYDROCHLORIDE 10 MG/ML
INJECTION, SOLUTION EPIDURAL; INFILTRATION; INTRACAUDAL; PERINEURAL AS NEEDED
Status: DISCONTINUED | OUTPATIENT
Start: 2023-08-11 | End: 2023-08-11 | Stop reason: SURG

## 2023-08-11 RX ORDER — NALOXONE HYDROCHLORIDE 0.4 MG/ML
80 INJECTION, SOLUTION INTRAMUSCULAR; INTRAVENOUS; SUBCUTANEOUS AS NEEDED
Status: DISCONTINUED | OUTPATIENT
Start: 2023-08-11 | End: 2023-08-11

## 2023-08-11 RX ADMIN — LIDOCAINE HYDROCHLORIDE 50 MG: 10 INJECTION, SOLUTION EPIDURAL; INFILTRATION; INTRACAUDAL; PERINEURAL at 09:41:00

## 2023-08-11 RX ADMIN — SODIUM CHLORIDE, SODIUM LACTATE, POTASSIUM CHLORIDE, CALCIUM CHLORIDE: 600; 310; 30; 20 INJECTION, SOLUTION INTRAVENOUS at 10:03:00

## 2023-08-11 RX ADMIN — SODIUM CHLORIDE, SODIUM LACTATE, POTASSIUM CHLORIDE, CALCIUM CHLORIDE: 600; 310; 30; 20 INJECTION, SOLUTION INTRAVENOUS at 09:38:00

## 2023-08-11 NOTE — H&P
History & Physical Examination    Patient Name: Jyotsna Muller  MRN: [id number]  CSN: [id number]  YOB: 1975    Diagnosis: colorectal cancer screening, history of adenomatous colon polyp    Colonoscopy last in November 2019 found to have a small adenomatous colon polyp. Seen in several office visits over the last 4 years for abdominal/gastrointestinal symptoms including LLQ pain which he again reports today over the last several weeks. Last seen in the office in May 2023 with again symptoms suspected to be functional in nature and discussed this with him then and again with him and spouse today. ALPRAZolam (XANAX) 0.25 MG Oral Tab, Take 1 tablet (0.25 mg total) by mouth daily as needed for Anxiety. , Disp: 30 tablet, Rfl: 1,  at prn  Linolenic Acid (OMEGA 3 OR), Take 1,250 mg by mouth 2 (two) times daily. , Disp: , Rfl:   dicyclomine 10 MG Oral Cap, Take 1 capsule (10 mg total) by mouth 3 (three) times daily as needed. , Disp: 90 capsule, Rfl: 0  diclofenac 75 MG Oral Tab EC, Take 1 tablet (75 mg total) by mouth 2 (two) times daily. Take 1 - 2 times a day (Patient not taking: Reported on 1/12/2023), Disp: 60 tablet, Rfl: 1  cyclobenzaprine 5 MG Oral Tab, Take 1 tablet (5 mg total) by mouth 3 (three) times daily as needed.  (Patient not taking: Reported on 1/12/2023), Disp: 60 tablet, Rfl: 1      lactated ringers infusion, , Intravenous, Continuous        Allergies: No Known Allergies    Past Medical History:   Diagnosis Date    Colon adenoma 11/25/2019    Disorder of thyroid     High cholesterol     Hyperlipidemia     Hyperthyroidism     Graves Disease    Hypothyroidism 2006    Pterygium     Vertigo 2013    1 episode only     Past Surgical History:   Procedure Laterality Date    CHOLECYSTECTOMY  2006    COLONOSCOPY N/A 11/25/2019    Procedure: COLONOSCOPY;  Surgeon: Audra Khan MD;  Location: [address] [place] [address] ENDOSCOPY    PTERYGIUM EXCISION - OD - RIGHT EYE  around 2004 per patient    REMOVAL GALLBLADDER Family History   Problem Relation Age of Onset    Other (systemic lupus) Mother          age 37    Diabetes Brother 27        type 2    Hypertension Father     Thyroid disease Other         sister, 2 brothers    Heart Disease Brother 28        unknown heart disease    Cancer Paternal Uncle         , lung, smoker    Diabetes Paternal Aunt     Cancer Maternal Aunt         breast, older age     Social History    Tobacco Use      Smoking status: Never      Smokeless tobacco: Never    Alcohol use: Yes      Alcohol/week: 1.0 standard drink of alcohol      Types: 1 Standard drinks or equivalent per week      Comment: on occ      SYSTEM Check if Physical Exam is Normal If not normal, please explain:   DARRIN [ Terrance Mina  [ Jl Watts [ Veronica Martinez [ Prabhjot Gonzalez [ Helena Upsala [ Usha Armenta      I have discussed the risks and benefits and alternatives of the procedure with the patient/family. They understand and agree to proceed with plan of care. I have reviewed the History and Physical done within the last 30 days. Any changes noted above.   Yari Dorsey MD  [facility], [facility] - Gastroenterology  2023  9:36 AM

## 2023-08-11 NOTE — ANESTHESIA POSTPROCEDURE EVALUATION
Patient: Jyotsna Muller    Procedure Summary       Date: 08/11/23 Room / Location: [facility] ENDOSCOPY 02 / [facility] ENDO    Anesthesia Start: 7013 Anesthesia Stop:     Procedure: COLONOSCOPY Diagnosis:       Screening for colorectal cancer      History of adenomatous polyp of colon      Family history of colon cancer      (diverticulosis, hemorrhoids)    Surgeons: Audra Khan MD Anesthesiologist:     Anesthesia Type: MAC ASA Status: 2            Anesthesia Type: MAC    Vitals Value Taken Time   /79 08/11/23 1003   Temp  08/11/23 1005   Pulse 67 08/11/23 1004   Resp 12 08/11/23 1004   SpO2 97 % 08/11/23 1004   Vitals shown include unvalidated device data.     [address] AN Post Evaluation:   Patient Evaluated in PACU  Patient Participation: complete - patient participated  Level of Consciousness: awake  Pain Management: adequate  Airway Patency:patent  Dental exam unchanged from preop  Yes    Cardiovascular Status: acceptable  Respiratory Status: acceptable  Postoperative Hydration acceptable      Ezekiel Garcia MD  8/11/2023 10:05 AM

## 2023-08-11 NOTE — TELEPHONE ENCOUNTER
GI staff: please place recall for colonoscopy in 7 years     Then close encounter    Thanks    Karli Patel MD  [address] - Gastroenterology  8/11/2023  10:13 AM

## 2023-08-11 NOTE — DISCHARGE INSTRUCTIONS
Home Care Instructions for Colonoscopy  with Sedation    Diet:  - Resume your regular diet as tolerated unless otherwise instructed. - Start with light meals to minimize bloating.  - Do not drink alcohol today. Medication:  - If you have questions about resuming your normal medications, please contact your Primary Care Physician. Activities:  - Take it easy today. Do not return to work today. - Do not drive today. - Do not operate any machinery today (including kitchen equipment). Colonoscopy:  - You may notice some rectal \"spotting\" (a little blood on the toilet tissue) for a day or two after the exam. This is normal.  - If you experience any rectal bleeding (not spotting), persistent tenderness or sharp severe abdominal pains, oral temperature over 100 degrees Fahrenheit, light-headedness or dizziness, or any other problems, contact your doctor. Gastroscopy:  - You may have a sore throat for 2-3 days following the exam. This is normal. Gargling with warm salt water (1/2 tsp salt to 1 glass warm water) or using throat lozenges will help. - If you experience any sharp pain in your neck, abdomen or chest, vomiting of blood, oral temperature over 100 degrees Fahrenheit, light-headedness or dizziness, or any other problems, contact your doctor. **If unable to reach your doctor, please go to the [facility] Emergency Room**    - Your referring physician will receive a full report of your examination.  - If you do not hear from your doctor's office within two weeks of your biopsy, please call them for your results. You may be able to see your laboratory results in [facility] between 4 and 7 business days. In some cases, your physician may not have viewed the results before they are released to [address] If you have questions regarding your results contact the physician who ordered the test/exam by phone or via [address] by choosing \"Ask a Medical Question. \"

## 2023-08-11 NOTE — TELEPHONE ENCOUNTER
Health maintenance updated. Last colonoscopy done 8/11/23. 7 year recall placed into Pt Outreach, next due on 08/2030 per Dr. Joseph Sherman.

## 2023-08-17 ENCOUNTER — PATIENT MESSAGE (OUTPATIENT)
Dept: ENDOCRINOLOGY CLINIC | Facility: CLINIC | Age: 48
End: 2023-08-17

## 2023-08-17 DIAGNOSIS — E05.90 HYPERTHYROIDISM: Primary | ICD-10-CM

## 2023-08-18 NOTE — TELEPHONE ENCOUNTER
From: Narinder Fajardo  To: Sam Wade MD  Sent: 8/17/2023 2:02 PM CDT  Subject: Follow up     Hi Dr. I am due to get lab work done for my thyroid. Can you please send the order in.

## 2023-10-10 ENCOUNTER — PATIENT MESSAGE (OUTPATIENT)
Dept: INTERNAL MEDICINE CLINIC | Facility: CLINIC | Age: 48
End: 2023-10-10

## 2023-10-10 NOTE — TELEPHONE ENCOUNTER
From: Mars Ervin  To: Thierno Meraz  Sent: 10/10/2023 10:21 AM CDT  Subject: lab results     Please review lab test and provide your recommendations.  Thank you

## 2023-10-29 ENCOUNTER — LAB ENCOUNTER (OUTPATIENT)
Dept: LAB | Facility: HOSPITAL | Age: 48
End: 2023-10-29
Attending: INTERNAL MEDICINE
Payer: COMMERCIAL

## 2023-10-29 DIAGNOSIS — E05.90 HYPERTHYROIDISM: ICD-10-CM

## 2023-10-29 DIAGNOSIS — R76.8 POSITIVE ANA (ANTINUCLEAR ANTIBODY): ICD-10-CM

## 2023-10-29 LAB
T3FREE SERPL-MCNC: 2.99 PG/ML (ref 2.4–4.2)
T4 FREE SERPL-MCNC: 1 NG/DL (ref 0.8–1.7)
TSI SER-ACNC: 2.38 MIU/ML (ref 0.36–3.74)

## 2023-10-29 PROCEDURE — 84481 FREE ASSAY (FT-3): CPT

## 2023-10-29 PROCEDURE — 86235 NUCLEAR ANTIGEN ANTIBODY: CPT

## 2023-10-29 PROCEDURE — 84439 ASSAY OF FREE THYROXINE: CPT

## 2023-10-29 PROCEDURE — 36415 COLL VENOUS BLD VENIPUNCTURE: CPT

## 2023-10-29 PROCEDURE — 84443 ASSAY THYROID STIM HORMONE: CPT

## 2023-10-30 ENCOUNTER — OFFICE VISIT (OUTPATIENT)
Dept: ENDOCRINOLOGY CLINIC | Facility: CLINIC | Age: 48
End: 2023-10-30

## 2023-10-30 DIAGNOSIS — E05.90 HYPERTHYROIDISM: Primary | ICD-10-CM

## 2023-10-30 PROCEDURE — 99213 OFFICE O/P EST LOW 20 MIN: CPT | Performed by: INTERNAL MEDICINE

## 2023-10-30 NOTE — PROGRESS NOTES
Name: Evan Jessica  Date: 10/30/2023    CC: Hyperthyroidism    HISTORY OF PRESENT ILLNESS   Evan Jessica is a 53year old male who presents for hyperthyroidism. He was previously maintained on levothyroxine therapy and dose has been significantly decreased over the past few months. He is now off medication but subclinical hyperthyroidism is persistent. Per patient he was previously maintained on Levothyroxine therapy for 10 years. He was previously having significant palpitations but now improved. In addition he was having mild insomnia which is improved. No tremor. Weight is stable. Compression Symptoms:   Dysphagia: No  Dyspnea: No  Voice Change: No  Anterior Neck Pain: No    12/2020  Since last visit he was diagnosed with Graves Disease. His I-131 scan confirmed diffuse uptake at 39% consistent with the disease. He has been started on Methimazole 10mg PO BID and tolerating medication well. No palpitations. No tremor and weight is stable. 10/2023  Since last visit he is feeling well. Disease is currently in remission. No palpitations, no tremor. Normal TFTs. REVIEW OF SYSTEMS  Eyes: no change in vision  Neurologic: no headache, generalized or focal weakness or numbness. Head: normal  ENT: normal  Lungs: no shortness of breath, wheezing or SMALLS  Cardiovascular:  no chest pain or palpitations  Gastrointestinal:  no abdominal pain, bowel movement problems  Musculoskeletal: no muscle pain or arthralgia  /Gyne: no frequency or discomfort while urinating  Psychiatric:  no acute distress, anxiety  or depression  Skin: normal moisturized skin    Medications:     Current Outpatient Medications:     dicyclomine 10 MG Oral Cap, Take 1 capsule (10 mg total) by mouth 3 (three) times daily as needed. , Disp: 90 capsule, Rfl: 0    ALPRAZolam (XANAX) 0.25 MG Oral Tab, Take 1 tablet (0.25 mg total) by mouth daily as needed for Anxiety. , Disp: 30 tablet, Rfl: 1    Linolenic Acid (OMEGA 3 OR), Take 1,250 mg by mouth 2 (two) times daily. , Disp: , Rfl:     diclofenac 75 MG Oral Tab EC, Take 1 tablet (75 mg total) by mouth 2 (two) times daily. Take 1 - 2 times a day (Patient not taking: Reported on 1/12/2023), Disp: 60 tablet, Rfl: 1    cyclobenzaprine 5 MG Oral Tab, Take 1 tablet (5 mg total) by mouth 3 (three) times daily as needed. (Patient not taking: Reported on 1/12/2023), Disp: 60 tablet, Rfl: 1     Allergies:   No Known Allergies    Social History:   Social History    Socioeconomic History      Marital status:     Tobacco Use      Smoking status: Never      Smokeless tobacco: Never    Vaping Use      Vaping Use: Never used    Substance and Sexual Activity      Alcohol use: Yes        Alcohol/week: 1.0 standard drink of alcohol        Types: 1 Standard drinks or equivalent per week        Comment: on occ      Drug use: No    Other Topics      Concerns:        Caffeine Concern: Yes          Coffee      Medical History:   Past Medical History:   Diagnosis Date    Colon adenoma 11/25/2019    Disorder of thyroid     High cholesterol     Hyperlipidemia     Hyperthyroidism     Graves Disease    Hypothyroidism 2006    Pterygium     Vertigo 2013    1 episode only       Surgical history:   Past Surgical History:   Procedure Laterality Date    CHOLECYSTECTOMY  2006    COLONOSCOPY N/A 11/25/2019    Procedure: COLONOSCOPY;  Surgeon: Olga Martinez MD;  Location: [address] [place] [address] ENDOSCOPY    COLONOSCOPY  08/11/2023    COLONOSCOPY N/A 8/11/2023    Procedure: COLONOSCOPY;  Surgeon: Olga Martinez MD;  Location: [facility] ENDO    PTERYGIUM EXCISION - OD - RIGHT EYE  around 2004 per patient    REMOVAL GALLBLADDER         PHYSICAL EXAM  General Appearance:  alert, well developed, in no acute distress  Eyes:  normal conjunctivae, sclera. , normal sclera and normal pupils  Throat/Neck: normal sound to voice. Back: no kyphosis  Respiratory:  non-labored. no increased work of breathing.     Lymph Nodes:  No abnormal nodes noted  Skin:  normal moisture and skin texture  Hematologic:  no excessive bruising  Psychiatric:  oriented to time, self, and place      ASSESSMENT/PLAN:    1.  Hyperthyroidism  - Discussed diagnosis of Graves Disease  - Discussed treatment for 12-18 months then evaluate for remission  - He has finished a year of therapy  - Disease is current in remission   - Recheck TSH, FT4, FT3 in 6 months   - Contact clinic if any palpitations, tremor   - Thyroid US demonstrated inflammation, no nodules   - Further management based on above results    RTC 1 year     10/30/2023  Yadira Berry MD

## 2023-11-01 LAB — ANTIHISTONE ABS: 1.4 UNITS

## 2023-11-08 ENCOUNTER — PATIENT MESSAGE (OUTPATIENT)
Dept: RHEUMATOLOGY | Facility: CLINIC | Age: 48
End: 2023-11-08

## 2023-11-08 DIAGNOSIS — R76.8 HISTONE ANTIBODY POSITIVE: Primary | ICD-10-CM

## 2023-11-08 NOTE — TELEPHONE ENCOUNTER
From: Blaire Maradiaga  To: Zenon Lynch  Sent: 11/8/2023 12:41 PM CST  Subject: Results    Hi should I be concerned with the ANTIHISTONE ANTIBODIES results?  Here are also my most recent labs

## 2024-04-29 ENCOUNTER — OFFICE VISIT (OUTPATIENT)
Dept: INTERNAL MEDICINE CLINIC | Facility: CLINIC | Age: 49
End: 2024-04-29

## 2024-04-29 VITALS
HEIGHT: 72 IN | OXYGEN SATURATION: 96 % | DIASTOLIC BLOOD PRESSURE: 72 MMHG | SYSTOLIC BLOOD PRESSURE: 122 MMHG | TEMPERATURE: 98 F | BODY MASS INDEX: 30.31 KG/M2 | HEART RATE: 82 BPM | WEIGHT: 223.81 LBS

## 2024-04-29 DIAGNOSIS — R30.0 DYSURIA: ICD-10-CM

## 2024-04-29 DIAGNOSIS — M54.50 ACUTE RIGHT-SIDED LOW BACK PAIN WITHOUT SCIATICA: Primary | ICD-10-CM

## 2024-04-29 PROCEDURE — 3074F SYST BP LT 130 MM HG: CPT | Performed by: INTERNAL MEDICINE

## 2024-04-29 PROCEDURE — 3078F DIAST BP <80 MM HG: CPT | Performed by: INTERNAL MEDICINE

## 2024-04-29 PROCEDURE — 3008F BODY MASS INDEX DOCD: CPT | Performed by: INTERNAL MEDICINE

## 2024-04-29 PROCEDURE — 99214 OFFICE O/P EST MOD 30 MIN: CPT | Performed by: INTERNAL MEDICINE

## 2024-04-29 RX ORDER — DICLOFENAC SODIUM 75 MG/1
75 TABLET, DELAYED RELEASE ORAL 2 TIMES DAILY
Qty: 60 TABLET | Refills: 1 | Status: SHIPPED | OUTPATIENT
Start: 2024-04-29

## 2024-04-29 RX ORDER — CIPROFLOXACIN 500 MG/1
500 TABLET, FILM COATED ORAL 2 TIMES DAILY
Qty: 18 TABLET | Refills: 0 | Status: SHIPPED | OUTPATIENT
Start: 2024-04-29

## 2024-04-29 NOTE — PROGRESS NOTES
Jeff Haney is a 49 year old male.    HPI:     Chief Complaint   Patient presents with    Follow - Up     Pt here due to urgent care f/u due to right side back problem x 5 days, including a fever.      48 y/o M with 5 d h/o right sided low back pain; works as  and does heavy lifting; also reports +dysuria with temp to 100; c/o mild suprapubic abdominal discomfort;  Went to urgent care 24; UA dipstick unremarkable; Was treated for possible UTI at [facility] with Cipro 500 mg po BID with decrease in dysuria    HISTORY:  Past Medical History:    Colon adenoma    Disorder of thyroid    High cholesterol    Hyperlipidemia    Hyperthyroidism    Graves Disease    Hypothyroidism    Pterygium    Vertigo    1 episode only      Past Surgical History:   Procedure Laterality Date    Cholecystectomy      Colonoscopy N/A 2019    Procedure: COLONOSCOPY;  Surgeon: Alexey Nelson MD;  Location: [facility] ENDOSCOPY    Colonoscopy  2023    Colonoscopy N/A 2023    Procedure: COLONOSCOPY;  Surgeon: Alexey Nelson MD;  Location: [facility] ENDO    Pterygium excision - od - right eye  around  per patient    Removal gallbladder        Family History   Problem Relation Age of Onset    Other (systemic lupus) Mother          age 43    Diabetes Brother 30        type 2    Hypertension Father     Thyroid disease Other         sister, 2 brothers    Heart Disease Brother 35        unknown heart disease    Cancer Paternal Uncle         , lung, smoker    Diabetes Paternal Aunt     Cancer Maternal Aunt         breast, older age      Social History:   Social History     Socioeconomic History    Marital status:    Tobacco Use    Smoking status: Never    Smokeless tobacco: Never   Vaping Use    Vaping status: Never Used   Substance and Sexual Activity    Alcohol use: Yes     Alcohol/week: 1.0 standard drink of alcohol     Types: 1 Standard drinks or equivalent per week     Comment: on  occ    Drug use: No   Other Topics Concern    Caffeine Concern Yes     Comment: Coffee        Medications (Active prior to today's visit):  Current Outpatient Medications   Medication Sig Dispense Refill    diclofenac 75 MG Oral Tab EC Take 1 tablet (75 mg total) by mouth 2 (two) times daily. 60 tablet 1    ciprofloxacin 500 MG Oral Tab Take 1 tablet (500 mg total) by mouth 2 (two) times daily. 18 tablet 0    dicyclomine 10 MG Oral Cap Take 1 capsule (10 mg total) by mouth 3 (three) times daily as needed. 90 capsule 0    ALPRAZolam (XANAX) 0.25 MG Oral Tab Take 1 tablet (0.25 mg total) by mouth daily as needed for Anxiety. 30 tablet 1    cyclobenzaprine 5 MG Oral Tab Take 1 tablet (5 mg total) by mouth 3 (three) times daily as needed. 60 tablet 1       Allergies:  No Known Allergies              ROS:   Constitutional: no weight loss; no fatigue  Cardiovascular:  Negative for chest pain; negative palpitations  Respiratory:  Negative for cough, dyspnea and wheezing  Gastrointestinal:  Negative for abdominal pain, constipation, decreased appetite, diarrhea and vomiting; no melena or hematochezia  All other review of systems are negative.        PHYSICAL EXAM:   Blood pressure 122/72, pulse 82, temperature 98.4 °F (36.9 °C), height 6' (1.829 m), weight 223 lb 12.8 oz (101.5 kg), SpO2 96%.  Constitutional: alert and oriented x3 in no acute distress  HEENT- EOMI, PERRL  Nose/Mouth/Throat: pharynx without erythema; no oral lesions  Neck/Thyroid: neck supple; no thyromegaly  Cardiovascular: RRR, S1, S2, no S3 or murmur  Respiratory: lungs without crackles or wheezes  Abdomen: normoactive bowel sounds, soft, non-tender and non-distended  Extremities: no clubbing, cyanosis or edema  MS: +tenderness to right paraspinal lumbar area         ASSESSMENT/PLAN:   Musculoskeletal LBP  -diclofenac 75 mg po BID  -hold on muscle relaxant Rx due to work as     Dysuria  With low grade temp to 100 degrees; abdominal exam is  benign;  UA unremarkable at urgent care on 4/27/24  -possible prostatitis; has had decrease with empiric Cipro given on 4/27  -extend Cipro 500 mg po BID add'l 9 days, for total 14 days treatment          Spent 30 minutes obtaining history, evaluating patient, discussing treatment options, diet, exercise, review of available labs and radiology reports, and completing documentation.            Orders This Visit:  No orders of the defined types were placed in this encounter.      Meds This Visit:  Requested Prescriptions     Signed Prescriptions Disp Refills    diclofenac 75 MG Oral Tab EC 60 tablet 1     Sig: Take 1 tablet (75 mg total) by mouth 2 (two) times daily.    ciprofloxacin 500 MG Oral Tab 18 tablet 0     Sig: Take 1 tablet (500 mg total) by mouth 2 (two) times daily.       Imaging & Referrals:  None     4/29/2024  Luis Manuel Durham MD

## 2024-05-03 ENCOUNTER — TELEPHONE (OUTPATIENT)
Dept: INTERNAL MEDICINE CLINIC | Facility: CLINIC | Age: 49
End: 2024-05-03

## 2024-05-03 ENCOUNTER — LAB ENCOUNTER (OUTPATIENT)
Dept: LAB | Facility: HOSPITAL | Age: 49
End: 2024-05-03
Attending: INTERNAL MEDICINE
Payer: COMMERCIAL

## 2024-05-03 DIAGNOSIS — R53.83 OTHER FATIGUE: ICD-10-CM

## 2024-05-03 DIAGNOSIS — E05.90 HYPERTHYROIDISM: ICD-10-CM

## 2024-05-03 DIAGNOSIS — R30.0 DYSURIA: ICD-10-CM

## 2024-05-03 DIAGNOSIS — R53.83 OTHER FATIGUE: Primary | ICD-10-CM

## 2024-05-03 DIAGNOSIS — R76.8 HISTONE ANTIBODY POSITIVE: ICD-10-CM

## 2024-05-03 LAB
ALBUMIN SERPL-MCNC: 4.4 G/DL (ref 3.2–4.8)
ALBUMIN/GLOB SERPL: 1.6 {RATIO} (ref 1–2)
ALP LIVER SERPL-CCNC: 71 U/L
ALT SERPL-CCNC: 27 U/L
ANION GAP SERPL CALC-SCNC: 7 MMOL/L (ref 0–18)
AST SERPL-CCNC: 29 U/L (ref ?–34)
BASOPHILS # BLD AUTO: 0.04 X10(3) UL (ref 0–0.2)
BASOPHILS NFR BLD AUTO: 0.6 %
BILIRUB SERPL-MCNC: 0.5 MG/DL (ref 0.3–1.2)
BILIRUB UR QL: NEGATIVE
BUN BLD-MCNC: 17 MG/DL (ref 9–23)
BUN/CREAT SERPL: 14.5 (ref 10–20)
CALCIUM BLD-MCNC: 9 MG/DL (ref 8.7–10.4)
CHLORIDE SERPL-SCNC: 107 MMOL/L (ref 98–112)
CLARITY UR: CLEAR
CO2 SERPL-SCNC: 24 MMOL/L (ref 21–32)
CREAT BLD-MCNC: 1.17 MG/DL
DEPRECATED RDW RBC AUTO: 38.4 FL (ref 35.1–46.3)
EGFRCR SERPLBLD CKD-EPI 2021: 77 ML/MIN/1.73M2 (ref 60–?)
EOSINOPHIL # BLD AUTO: 0.15 X10(3) UL (ref 0–0.7)
EOSINOPHIL NFR BLD AUTO: 2.2 %
ERYTHROCYTE [DISTWIDTH] IN BLOOD BY AUTOMATED COUNT: 11.8 % (ref 11–15)
FASTING STATUS PATIENT QL REPORTED: NO
GLOBULIN PLAS-MCNC: 2.8 G/DL (ref 2–3.5)
GLUCOSE BLD-MCNC: 87 MG/DL (ref 70–99)
GLUCOSE UR-MCNC: NORMAL MG/DL
HCT VFR BLD AUTO: 38.2 %
HGB BLD-MCNC: 13.9 G/DL
HGB UR QL STRIP.AUTO: NEGATIVE
IMM GRANULOCYTES # BLD AUTO: 0.02 X10(3) UL (ref 0–1)
IMM GRANULOCYTES NFR BLD: 0.3 %
LEUKOCYTE ESTERASE UR QL STRIP.AUTO: NEGATIVE
LYMPHOCYTES # BLD AUTO: 2.13 X10(3) UL (ref 1–4)
LYMPHOCYTES NFR BLD AUTO: 30.6 %
MCH RBC QN AUTO: 32.9 PG (ref 26–34)
MCHC RBC AUTO-ENTMCNC: 36.4 G/DL (ref 31–37)
MCV RBC AUTO: 90.3 FL
MONOCYTES # BLD AUTO: 0.55 X10(3) UL (ref 0.1–1)
MONOCYTES NFR BLD AUTO: 7.9 %
NEUTROPHILS # BLD AUTO: 4.07 X10 (3) UL (ref 1.5–7.7)
NEUTROPHILS # BLD AUTO: 4.07 X10(3) UL (ref 1.5–7.7)
NEUTROPHILS NFR BLD AUTO: 58.4 %
NITRITE UR QL STRIP.AUTO: NEGATIVE
OSMOLALITY SERPL CALC.SUM OF ELEC: 287 MOSM/KG (ref 275–295)
PH UR: 6 [PH] (ref 5–8)
PLATELET # BLD AUTO: 343 10(3)UL (ref 150–450)
POTASSIUM SERPL-SCNC: 4.1 MMOL/L (ref 3.5–5.1)
PROT SERPL-MCNC: 7.2 G/DL (ref 5.7–8.2)
RBC # BLD AUTO: 4.23 X10(6)UL
SODIUM SERPL-SCNC: 138 MMOL/L (ref 136–145)
SP GR UR STRIP: 1.02 (ref 1–1.03)
T PALLIDUM AB SER QL IA: NONREACTIVE
T3FREE SERPL-MCNC: 3.32 PG/ML (ref 2.4–4.2)
T4 FREE SERPL-MCNC: 1.2 NG/DL (ref 0.8–1.7)
TSI SER-ACNC: 3.68 MIU/ML (ref 0.55–4.78)
UROBILINOGEN UR STRIP-ACNC: NORMAL
WBC # BLD AUTO: 7 X10(3) UL (ref 4–11)

## 2024-05-03 PROCEDURE — 84439 ASSAY OF FREE THYROXINE: CPT

## 2024-05-03 PROCEDURE — 84443 ASSAY THYROID STIM HORMONE: CPT

## 2024-05-03 PROCEDURE — 80053 COMPREHEN METABOLIC PANEL: CPT

## 2024-05-03 PROCEDURE — 87591 N.GONORRHOEAE DNA AMP PROB: CPT

## 2024-05-03 PROCEDURE — 81003 URINALYSIS AUTO W/O SCOPE: CPT

## 2024-05-03 PROCEDURE — 36415 COLL VENOUS BLD VENIPUNCTURE: CPT

## 2024-05-03 PROCEDURE — 84481 FREE ASSAY (FT-3): CPT

## 2024-05-03 PROCEDURE — 87491 CHLMYD TRACH DNA AMP PROBE: CPT

## 2024-05-03 PROCEDURE — 86780 TREPONEMA PALLIDUM: CPT

## 2024-05-03 PROCEDURE — 85025 COMPLETE CBC W/AUTO DIFF WBC: CPT

## 2024-05-03 PROCEDURE — 87086 URINE CULTURE/COLONY COUNT: CPT

## 2024-05-03 PROCEDURE — 86235 NUCLEAR ANTIGEN ANTIBODY: CPT

## 2024-05-03 PROCEDURE — 87661 TRICHOMONAS VAGINALIS AMPLIF: CPT

## 2024-05-03 NOTE — TELEPHONE ENCOUNTER
C/o fatigue  C/o dysuria  -check CBC, CMP, TSH  -check UA C&S, urine GC/chlamydia, trich, treponemal Ab    Wife called

## 2024-05-03 NOTE — TELEPHONE ENCOUNTER
Patient wife Rani calling.  Patient saw Dr. Garza 4/29/2024. He is not feeling any better, still has symptoms of low back pain and frequent urination and burning.  Started last night with feet tingling. Wife is stating patient feels anxious about not knowing what is going on.    Best call back number [phone]

## 2024-05-06 LAB
C TRACH DNA SPEC QL NAA+PROBE: NEGATIVE
N GONORRHOEA DNA SPEC QL NAA+PROBE: NEGATIVE

## 2024-05-07 LAB
ANTIHISTONE ABS: 1.8 UNITS
TRICH VAG NAA: NEGATIVE

## 2024-08-14 ENCOUNTER — LAB ENCOUNTER (OUTPATIENT)
Dept: LAB | Age: 49
End: 2024-08-14
Attending: INTERNAL MEDICINE
Payer: COMMERCIAL

## 2024-08-14 ENCOUNTER — OFFICE VISIT (OUTPATIENT)
Dept: INTERNAL MEDICINE CLINIC | Facility: CLINIC | Age: 49
End: 2024-08-14

## 2024-08-14 VITALS
HEART RATE: 88 BPM | WEIGHT: 219 LBS | OXYGEN SATURATION: 95 % | BODY MASS INDEX: 29.66 KG/M2 | HEIGHT: 72 IN | SYSTOLIC BLOOD PRESSURE: 130 MMHG | DIASTOLIC BLOOD PRESSURE: 88 MMHG

## 2024-08-14 DIAGNOSIS — Z01.818 PRE-OP EXAM: ICD-10-CM

## 2024-08-14 DIAGNOSIS — Z01.818 PRE-OP EXAM: Primary | ICD-10-CM

## 2024-08-14 LAB
ALBUMIN SERPL-MCNC: 4.5 G/DL (ref 3.2–4.8)
ALBUMIN/GLOB SERPL: 1.3 {RATIO} (ref 1–2)
ALP LIVER SERPL-CCNC: 66 U/L
ALT SERPL-CCNC: 25 U/L
ANION GAP SERPL CALC-SCNC: 5 MMOL/L (ref 0–18)
AST SERPL-CCNC: 22 U/L (ref ?–34)
ATRIAL RATE: 70 BPM
BASOPHILS # BLD AUTO: 0.08 X10(3) UL (ref 0–0.2)
BASOPHILS NFR BLD AUTO: 1 %
BILIRUB SERPL-MCNC: 0.7 MG/DL (ref 0.3–1.2)
BUN BLD-MCNC: 18 MG/DL (ref 9–23)
BUN/CREAT SERPL: 14.5 (ref 10–20)
CALCIUM BLD-MCNC: 9.6 MG/DL (ref 8.7–10.4)
CHLORIDE SERPL-SCNC: 107 MMOL/L (ref 98–112)
CO2 SERPL-SCNC: 28 MMOL/L (ref 21–32)
CREAT BLD-MCNC: 1.24 MG/DL
DEPRECATED RDW RBC AUTO: 38.7 FL (ref 35.1–46.3)
EGFRCR SERPLBLD CKD-EPI 2021: 72 ML/MIN/1.73M2 (ref 60–?)
EOSINOPHIL # BLD AUTO: 0.17 X10(3) UL (ref 0–0.7)
EOSINOPHIL NFR BLD AUTO: 2.1 %
ERYTHROCYTE [DISTWIDTH] IN BLOOD BY AUTOMATED COUNT: 11.4 % (ref 11–15)
FASTING STATUS PATIENT QL REPORTED: NO
GLOBULIN PLAS-MCNC: 3.5 G/DL (ref 2–3.5)
GLUCOSE BLD-MCNC: 89 MG/DL (ref 70–99)
HCT VFR BLD AUTO: 40.2 %
HGB BLD-MCNC: 13.9 G/DL
IMM GRANULOCYTES # BLD AUTO: 0.04 X10(3) UL (ref 0–1)
IMM GRANULOCYTES NFR BLD: 0.5 %
LYMPHOCYTES # BLD AUTO: 2.33 X10(3) UL (ref 1–4)
LYMPHOCYTES NFR BLD AUTO: 28.3 %
MCH RBC QN AUTO: 32.1 PG (ref 26–34)
MCHC RBC AUTO-ENTMCNC: 34.6 G/DL (ref 31–37)
MCV RBC AUTO: 92.8 FL
MONOCYTES # BLD AUTO: 0.64 X10(3) UL (ref 0.1–1)
MONOCYTES NFR BLD AUTO: 7.8 %
NEUTROPHILS # BLD AUTO: 4.96 X10 (3) UL (ref 1.5–7.7)
NEUTROPHILS # BLD AUTO: 4.96 X10(3) UL (ref 1.5–7.7)
NEUTROPHILS NFR BLD AUTO: 60.3 %
OSMOLALITY SERPL CALC.SUM OF ELEC: 291 MOSM/KG (ref 275–295)
P AXIS: 43 DEGREES
P-R INTERVAL: 156 MS
PLATELET # BLD AUTO: 358 10(3)UL (ref 150–450)
POTASSIUM SERPL-SCNC: 4.1 MMOL/L (ref 3.5–5.1)
PROT SERPL-MCNC: 8 G/DL (ref 5.7–8.2)
Q-T INTERVAL: 404 MS
QRS DURATION: 86 MS
QTC CALCULATION (BEZET): 436 MS
R AXIS: -86 DEGREES
RBC # BLD AUTO: 4.33 X10(6)UL
SODIUM SERPL-SCNC: 140 MMOL/L (ref 136–145)
T AXIS: 65 DEGREES
VENTRICULAR RATE: 70 BPM
WBC # BLD AUTO: 8.2 X10(3) UL (ref 4–11)

## 2024-08-14 PROCEDURE — 93005 ELECTROCARDIOGRAM TRACING: CPT

## 2024-08-14 PROCEDURE — 3075F SYST BP GE 130 - 139MM HG: CPT | Performed by: INTERNAL MEDICINE

## 2024-08-14 PROCEDURE — 3008F BODY MASS INDEX DOCD: CPT | Performed by: INTERNAL MEDICINE

## 2024-08-14 PROCEDURE — 3079F DIAST BP 80-89 MM HG: CPT | Performed by: INTERNAL MEDICINE

## 2024-08-14 PROCEDURE — 80053 COMPREHEN METABOLIC PANEL: CPT

## 2024-08-14 PROCEDURE — 36415 COLL VENOUS BLD VENIPUNCTURE: CPT

## 2024-08-14 PROCEDURE — 93010 ELECTROCARDIOGRAM REPORT: CPT | Performed by: INTERNAL MEDICINE

## 2024-08-14 PROCEDURE — 85025 COMPLETE CBC W/AUTO DIFF WBC: CPT

## 2024-08-14 PROCEDURE — 99214 OFFICE O/P EST MOD 30 MIN: CPT | Performed by: INTERNAL MEDICINE

## 2024-08-14 NOTE — PROGRESS NOTES
[facility]  Zurdo Montalvo DPM    [place] Office  [address] [place] [address]    Phone: [phone]  Fax: [phone]    Pre-operative testing to include (but not required) per Dr. Camacho's Clinic Team: EKG H&P CBC & CMP. They noted that sometimes PST will ask for this vs. The office directly.

## 2024-08-14 NOTE — PROGRESS NOTES
Jeff Haney is a 49 year old male  Chief Complaint   Patient presents with    Surgical/procedure Clearance     Here today for pre-operative clearance prior to 8/21/24 Left HARIKA and PTL Surgery with Dr. Mayen ([place], [place]); Physical Therapy will be done at their facility as well post-operatively.      HPI:   Jeff Haney is a 49 year old male who presents for a pre-op visit.    Accompanied by his wife.    Surgery on L ankle w/Dr. Zurdo Mayen on 8/21/24 for tear of anterior talofibular ligament which occurred while playing soccer.    Denies hx MI, CVA/TIA, CHF, CKD, DM.    Is able to ambulate >2 flights of stairs without CP or dyspnea. Prior to injury pt would run 2-3 miles 3x/week w/o CP.    Denies CP, palpitations, dyspnea, orthopnea, PND, LE edema, dizziness, syncope.    Denies f/c, headaches, sinus congestion, cough, ab pain, n/v/d, dysuria, rashes.    Wt Readings from Last 6 Encounters:   08/14/24 219 lb (99.3 kg)   04/29/24 223 lb 12.8 oz (101.5 kg)   08/11/23 215 lb (97.5 kg)   05/11/23 216 lb 12.8 oz (98.3 kg)   01/12/23 220 lb (99.8 kg)   10/27/22 217 lb (98.4 kg)     Body mass index is 29.7 kg/m².       Current Outpatient Medications   Medication Sig Dispense Refill    ALPRAZolam (XANAX) 0.25 MG Oral Tab Take 1 tablet (0.25 mg total) by mouth daily as needed for Anxiety. 30 tablet 1      Past Medical History:    Colon adenoma    Disorder of thyroid    High cholesterol    Hyperlipidemia    Hyperthyroidism    Graves Disease    Hypothyroidism    Pterygium    Vertigo    1 episode only      Past Surgical History:   Procedure Laterality Date    Cholecystectomy  2006    Colonoscopy N/A 11/25/2019    Procedure: COLONOSCOPY;  Surgeon: Alexey Nelson MD;  Location: [facility] ENDOSCOPY    Colonoscopy  08/11/2023    Colonoscopy N/A 8/11/2023    Procedure: COLONOSCOPY;  Surgeon: Alexey Nelson MD;  Location: [facility] ENDO    Pterygium excision - od - right eye  around 2004 per patient     Removal gallbladder        Family History   Problem Relation Age of Onset    Other (systemic lupus) Mother          age 43    Diabetes Brother 30        type 2    Hypertension Father     Thyroid disease Other         sister, 2 brothers    Heart Disease Brother 35        unknown heart disease    Cancer Paternal Uncle         , lung, smoker    Diabetes Paternal Aunt     Cancer Maternal Aunt         breast, older age      Social History:  Social History     Socioeconomic History    Marital status:    Tobacco Use    Smoking status: Never    Smokeless tobacco: Never   Vaping Use    Vaping status: Never Used   Substance and Sexual Activity    Alcohol use: Yes     Alcohol/week: 1.0 standard drink of alcohol     Types: 1 Standard drinks or equivalent per week     Comment: on occ    Drug use: No   Other Topics Concern    Caffeine Concern Yes     Comment: Coffee           REVIEW OF SYSTEMS:   GENERAL: feels well otherwise  EYES:denies blurred vision or double vision  HEENT: denies nasal congestion, sinus pain, ST, or sore throat  LUNGS: denies shortness of breath, cough or wheezing  CARDIOVASCULAR: denies chest pain or pressure or palpitations  GI: denies abdominal pain, N/V, diarrhea, constipation, hematochezia or melena  : denies nocturia or changes in stream  NEURO: denies headaches, dizziness or focal weakness  SKIN: denies lesions, rashes or wounds    EXAM:   /88   Pulse 88   Ht 6' (1.829 m)   Wt 219 lb (99.3 kg)   SpO2 95%   BMI 29.70 kg/m²     GENERAL: well developed, well nourished, in no apparent distress  NECK: supple,  no carotid bruits  LUNGS: clear to auscultation b/l, no w/r/r  CARDIO: RRR, normal S1S2, no m/r/g   EXTREMITIES: no edema, +2 DP pulses bilaterally  NEURO: A&O x 3, moves all 4 extremities normally      ASSESSMENT AND PLAN:   Jeff Haney is a 49 year old male who presents for a pre-op visit.    Pre-op exam  - RCRI score 0, Class I risk  - able to achieve >4 METs w/o CP  or dyspnea.   - Addenda 8/16/24: pre-op labs wnl. EKG w/o evidence of ischemic changes compared to prior. No further testing indicated prior to proposed surgery from a medical standpoint.  - CBC W Differential W Platelet [E]; Future  - Comp Metabolic Panel (14) [E]; Future  - EKG 12 Lead to be performed at [facility]; Future    RTC as previously scheduled or sooner PRN.    For E/M code - 30 minutes spent reviewing performing chart review, obtaining a history, performing a physical exam, reviewing the assessment/plan, placing orders, and completing documentation.     Corry Ruiz DO  8/14/2024  3:31 PM

## 2024-08-15 ENCOUNTER — TELEPHONE (OUTPATIENT)
Dept: INTERNAL MEDICINE CLINIC | Facility: CLINIC | Age: 49
End: 2024-08-15

## 2024-08-15 NOTE — TELEPHONE ENCOUNTER
Received correspondence from Dr. Mayen from podiatry with plans for surgery 8/21/2024, correspondence was sent 8/14/2024.    This is a very tight timeframe to accommodate this request.  Patient needs to be seen for evaluation, EKG and blood test.  I can get him in tomorrow 7:30 AM or 1 PM, and see an associate.  The timeframe is cutting a close because we are not aware of the surgery ahead of time.

## 2024-08-15 NOTE — TELEPHONE ENCOUNTER
Called patient, and spoke to wife Rani.  Patient was seen yesterday by Dr. Ruiz for pre-op clearance yesterday 8/14/2024    Rani is requesting patient's test results and EKG.      Please review

## 2024-08-16 ENCOUNTER — TELEPHONE (OUTPATIENT)
Dept: INTERNAL MEDICINE CLINIC | Facility: CLINIC | Age: 49
End: 2024-08-16

## 2024-08-16 NOTE — TELEPHONE ENCOUNTER
Copy of Complete Blood Count (CBC) and Comprehensive Metabolic Panel (CMP) ,EKG and pre-op visit note faxed to DR. Mayen at [phone]-confirmation received

## 2024-08-16 NOTE — TELEPHONE ENCOUNTER
To RN staff:    Please send pre-op note, CBC, CMP, and EKG from 8/14 to Dr. Zurdo Mayen's office, contact info in OV 8/14 by [facility].     Thank you!

## 2024-08-19 NOTE — TELEPHONE ENCOUNTER
Patient's wife Rani is calling patient is asking to get a call back with lab results & EKG for his upcoming surgery    Please call patient [phone]

## 2024-08-19 NOTE — TELEPHONE ENCOUNTER
Seen by Dr. Ruiz in my absence.  But the blood work was normal with CBC and CMP all within normal limits.  EKG was read by cardiology with normal sinus rhythm, minor notations of left axis deviation though may not be the best quality EKG as one of the leads had some artifact.  This should not impact his surgery and we can revisit this when I follow-up with him in September.

## 2024-08-29 ENCOUNTER — PATIENT MESSAGE (OUTPATIENT)
Dept: INTERNAL MEDICINE CLINIC | Facility: CLINIC | Age: 49
End: 2024-08-29

## 2024-08-29 ENCOUNTER — PATIENT MESSAGE (OUTPATIENT)
Dept: ENDOCRINOLOGY CLINIC | Facility: CLINIC | Age: 49
End: 2024-08-29

## 2024-08-29 DIAGNOSIS — E06.3 HYPOTHYROIDISM DUE TO HASHIMOTO'S THYROIDITIS: Primary | ICD-10-CM

## 2024-08-29 DIAGNOSIS — E55.9 VITAMIN D DEFICIENCY: ICD-10-CM

## 2024-08-29 DIAGNOSIS — E78.1 HYPERTRIGLYCERIDEMIA: Primary | ICD-10-CM

## 2024-08-29 DIAGNOSIS — E78.2 MIXED HYPERLIPIDEMIA: ICD-10-CM

## 2024-08-29 DIAGNOSIS — R73.01 ELEVATED FASTING GLUCOSE: ICD-10-CM

## 2024-08-29 NOTE — TELEPHONE ENCOUNTER
To Dr. LOCKETT:  Lipid panel pended for review - recent CBC and CMP done on 8/14 so I did not pend those & pt sees Dr. Mae

## 2024-08-30 NOTE — TELEPHONE ENCOUNTER
From: Jeff Haney  To: Linette Mae  Sent: 8/29/2024 4:49 PM CDT  Subject: Labs    Good afternoon. I have an appointment with you Sept 9. Do I need any lab work done prior to seeing you?

## 2024-09-07 ENCOUNTER — LAB ENCOUNTER (OUTPATIENT)
Dept: LAB | Age: 49
End: 2024-09-07
Attending: INTERNAL MEDICINE
Payer: COMMERCIAL

## 2024-09-07 DIAGNOSIS — E06.3 HYPOTHYROIDISM DUE TO HASHIMOTO'S THYROIDITIS: ICD-10-CM

## 2024-09-07 DIAGNOSIS — R73.01 ELEVATED FASTING GLUCOSE: ICD-10-CM

## 2024-09-07 DIAGNOSIS — E78.1 HYPERTRIGLYCERIDEMIA: ICD-10-CM

## 2024-09-07 LAB
ANION GAP SERPL CALC-SCNC: 7 MMOL/L (ref 0–18)
BUN BLD-MCNC: 17 MG/DL (ref 9–23)
BUN/CREAT SERPL: 14.8 (ref 10–20)
CALCIUM BLD-MCNC: 10.1 MG/DL (ref 8.7–10.4)
CHLORIDE SERPL-SCNC: 104 MMOL/L (ref 98–112)
CHOLEST SERPL-MCNC: 206 MG/DL (ref ?–200)
CO2 SERPL-SCNC: 28 MMOL/L (ref 21–32)
CREAT BLD-MCNC: 1.15 MG/DL
EGFRCR SERPLBLD CKD-EPI 2021: 79 ML/MIN/1.73M2 (ref 60–?)
EST. AVERAGE GLUCOSE BLD GHB EST-MCNC: 88 MG/DL (ref 68–126)
FASTING PATIENT LIPID ANSWER: YES
FASTING STATUS PATIENT QL REPORTED: YES
GLUCOSE BLD-MCNC: 108 MG/DL (ref 70–99)
HBA1C MFR BLD: 4.7 % (ref ?–5.7)
HDLC SERPL-MCNC: 40 MG/DL (ref 40–59)
LDLC SERPL CALC-MCNC: 96 MG/DL (ref ?–100)
NONHDLC SERPL-MCNC: 166 MG/DL (ref ?–130)
OSMOLALITY SERPL CALC.SUM OF ELEC: 290 MOSM/KG (ref 275–295)
POTASSIUM SERPL-SCNC: 3.9 MMOL/L (ref 3.5–5.1)
SODIUM SERPL-SCNC: 139 MMOL/L (ref 136–145)
T3FREE SERPL-MCNC: 3.42 PG/ML (ref 2.4–4.2)
T4 FREE SERPL-MCNC: 1.3 NG/DL (ref 0.8–1.7)
TRIGL SERPL-MCNC: 423 MG/DL (ref 30–149)
TSI SER-ACNC: 4.08 MIU/ML (ref 0.55–4.78)
VLDLC SERPL CALC-MCNC: 71 MG/DL (ref 0–30)

## 2024-09-07 PROCEDURE — 84481 FREE ASSAY (FT-3): CPT

## 2024-09-07 PROCEDURE — 84443 ASSAY THYROID STIM HORMONE: CPT

## 2024-09-07 PROCEDURE — 80061 LIPID PANEL: CPT

## 2024-09-07 PROCEDURE — 80048 BASIC METABOLIC PNL TOTAL CA: CPT

## 2024-09-07 PROCEDURE — 84439 ASSAY OF FREE THYROXINE: CPT

## 2024-09-07 PROCEDURE — 36415 COLL VENOUS BLD VENIPUNCTURE: CPT

## 2024-09-07 PROCEDURE — 83036 HEMOGLOBIN GLYCOSYLATED A1C: CPT

## 2024-09-15 NOTE — H&P
Jeff Haney is a 49 year old male.    HPI:     Chief Complaint   Patient presents with    Physical     Status post surgery--Lt ankle ligament repaired 8/21/24. Cast removed 1 week ago. Wearing boot for 3 weeks.  Pt starts tomorrow x3 weeks.       is a 49 year old male past medical history of Graves' disease, hyperlipidemia, presents today for annual physical examination    Outside hospital records reviewed.  Did have left ankle arthroscopy with extensive debridement and ligament repair 8/21/2024.  Resting was removed 8/27, cast removed due to increased pain and decreased sensation to the toes.  Has been following closely with Dr. Mayen of podiatry planning for physical therapy 9/18.  Last seen 9/16/2024, wearing CAM boot at all times except for showering and sleeping    Foot is stabilized in the CAM boot. Will be in the cast for 3 weeks. Will be starting the PT soon. Recalls the initial injury in 7/27.     Anxiety has been higher. Last week was the last use of alprazolam. Has nighttime awakenings. This is every other day. He is doing more activities.       I reviewed and updated the PMHx, FamHx, medications, allergies, and SocHx as below with the patient    SocHX  - Home: feels safe at home; with wife,  - Work: feels safe at work;   - Sexual Activity: with wife  - Hobbies: exercise, fishing.   - Nutrition: whatever his wife - meat, beef, sometimes salad, eat out on weekends - cheeseburgers. Enough fruits and veggies, drinking lots of water. More Veggies and shoots.   - Physical Activity:  3x a week running 2-3 miles.      HISTORY:  Past Medical History:    Anxiety    Colon adenoma    Disorder of thyroid    Essential hypertension    High cholesterol    Hyperlipidemia    Hyperthyroidism    Graves Disease    Hypothyroidism    Obesity    Pterygium    Vertigo    1 episode only      Past Surgical History:   Procedure Laterality Date    Cholecystectomy  2006    Colonoscopy N/A 11/25/2019     Procedure: COLONOSCOPY;  Surgeon: Alexey Nelson MD;  Location: [facility] ENDOSCOPY    Colonoscopy  2023    Colonoscopy N/A 2023    Procedure: COLONOSCOPY;  Surgeon: Alexey Nelson MD;  Location: [facility] ENDO    Pterygium excision - od - right eye  around  per patient    Removal gallbladder        Family History   Problem Relation Age of Onset    Other (systemic lupus) Mother          age 43    Diabetes Brother 30        type 2    Hypertension Father     Thyroid disease Other         sister, 2 brothers    Heart Disease Brother 35        unknown heart disease    Cancer Paternal Uncle         , lung, smoker    Diabetes Paternal Aunt     Cancer Maternal Aunt         breast, older age      Social History:   Social History     Socioeconomic History    Marital status:    Tobacco Use    Smoking status: Never     Passive exposure: Current (co-workers 5 ft--outside)    Smokeless tobacco: Never   Vaping Use    Vaping status: Never Used   Substance and Sexual Activity    Alcohol use: Yes     Alcohol/week: 1.0 standard drink of alcohol     Comment: on occ    Drug use: No   Other Topics Concern    Caffeine Concern Yes     Comment: Coffee        Medications (Active prior to today's visit):  Current Outpatient Medications   Medication Sig Dispense Refill    ALPRAZolam (XANAX) 0.25 MG Oral Tab Take 1 tablet (0.25 mg total) by mouth daily as needed for Anxiety. 30 tablet 1       Allergies:  No Known Allergies      ROS:   Positive Findings indicated in BOLD    Constitutional: Fever, Chills, Weight Gain, Weight Loss, Night Sweats, Fatigue, Malaise  ENT/Mouth:  Hearing Changes, Ear Pain, Nasal Congestion, Sinus Pain, Hoarseness, Sore throat, Rhinorrhea, Swallowing Difficulty  Eyes: Eye Pain, Swelling, Redness, Foreign Body, Discharge, Vision Changes  Cardiovascular: Chest Pain, SOB, PND, Dyspnea on Exertion, Orthopnea, Claudication, Edema, Palpitations  Respiratory: Cough, Sputum, Wheezing, Shortness of  breath  Gastrointestinal: Nausea, Vomiting, Diarrhea, Constipation, Pain, Heartburn, Dysphagia, Bloody stools, Tarry stools  Genitourinary: Dysmenorrhea, Dysuria, Urinary Frequency, Hematuria, Urinary Incontinence, Urgency,  Flank Pain  Musculoskeletal: Arthralgias, Myalgias, Joint Swelling, Joint Stiffness, Back Pain, Neck Pain  Integumentary: Skin Lesions, Pruritis, Hair Changes, Jaundice, Nail changes  Neuro: Weakness, Numbness, Paresthesias, Loss of Consciousness, Syncope, Dizziness, Headache, Falls  Psych: Anxiety, Depression, Insomnia, Suicidal Ideation, Homicidal ideation, Memory Changes  Heme/Lymph: Bruising, Bleeding, Lymphadenopathy  Endocrine: Polyuria, Polydipsia, Temperature Intolerance    PHYSICAL EXAM:   Vital Signs:  Blood pressure 122/82, pulse 79, temperature 98.4 °F (36.9 °C), temperature source Oral, height 6' (1.829 m), weight 220 lb (99.8 kg), SpO2 94%.     Constitutional: No acute distress. Alert and oriented x 3.  Eyes: EOMI, PERRLA, clear sclera b/l  HENT: NCAT, Moist mucous membranes, Oropharynx without erythema or exudates  Neck: No JVD, no thyromegaly  Cardiovascular: S1, S2, no S3, no S4, Regular rate and rhythm, No murmurs/gallops/rubs.   Vascular: Equal pulses 2+  Respiratory: Clear to auscultation bilaterally.  No wheezes/rales/rhonchi  Gastrointestinal: Soft, NT, nondistended. Positive bowel sounds x 4. No rebound tenderness. No hepatomegaly, No splenomegaly  Genitourinary: No CVA tenderness bilaterally  Neurologic: No focal neurological deficits, CN II-XII intact, light touch intact, MSK Strength 5/5 and symmetric in all extremities, normal gait, 2+ DTR  Musculoskeletal: Full range of motion of all extremities, no clubbing/swelling/edema  +L leg cast  Skin: No lesions, No erythema, no jaundice, Cap Refill < 2s  Psychiatric: Appropriate mood and affect  Heme/Lymph/Immune: No cervical LAD      DATA REVIEWED   Labs:  Recent Results (from the past 8760 hour(s))   CBC W Differential W  Platelet [E]    Collection Time: 08/14/24  4:01 PM   Result Value Ref Range    WBC 8.2 4.0 - 11.0 x10(3) uL    RBC 4.33 4.30 - 5.70 x10(6)uL    HGB 13.9 13.0 - 17.5 g/dL    HCT 40.2 39.0 - 53.0 %    MCV 92.8 80.0 - 100.0 fL    MCH 32.1 26.0 - 34.0 pg    MCHC 34.6 31.0 - 37.0 g/dL    RDW-SD 38.7 35.1 - 46.3 fL    RDW 11.4 11.0 - 15.0 %    .0 150.0 - 450.0 10(3)uL    Neutrophil Absolute Prelim 4.96 1.50 - 7.70 x10 (3) uL    Neutrophil Absolute 4.96 1.50 - 7.70 x10(3) uL    Lymphocyte Absolute 2.33 1.00 - 4.00 x10(3) uL    Monocyte Absolute 0.64 0.10 - 1.00 x10(3) uL    Eosinophil Absolute 0.17 0.00 - 0.70 x10(3) uL    Basophil Absolute 0.08 0.00 - 0.20 x10(3) uL    Immature Granulocyte Absolute 0.04 0.00 - 1.00 x10(3) uL    Neutrophil % 60.3 %    Lymphocyte % 28.3 %    Monocyte % 7.8 %    Eosinophil % 2.1 %    Basophil % 1.0 %    Immature Granulocyte % 0.5 %     *Note: Due to a large number of results and/or encounters for the requested time period, some results have not been displayed. A complete set of results can be found in Results Review.       Recent Results (from the past 8760 hour(s))   Basic Metabolic Panel (8)    Collection Time: 09/07/24  8:24 AM   Result Value Ref Range    Glucose 108 (H) 70 - 99 mg/dL    Sodium 139 136 - 145 mmol/L    Potassium 3.9 3.5 - 5.1 mmol/L    Chloride 104 98 - 112 mmol/L    CO2 28.0 21.0 - 32.0 mmol/L    Anion Gap 7 0 - 18 mmol/L    BUN 17 9 - 23 mg/dL    Creatinine 1.15 0.70 - 1.30 mg/dL    BUN/CREA Ratio 14.8 10.0 - 20.0    Calcium, Total 10.1 8.7 - 10.4 mg/dL    Calculated Osmolality 290 275 - 295 mOsm/kg    eGFR-Cr 79 >=60 mL/min/1.73m2    Patient Fasting for BMP? Yes      *Note: Due to a large number of results and/or encounters for the requested time period, some results have not been displayed. A complete set of results can be found in Results Review.       Cholesterol, Total (mg/dL)   Date Value   09/07/2024 206 (H)     HDL Cholesterol (mg/dL)   Date Value    09/07/2024 40     LDL Cholesterol (mg/dL)   Date Value   09/07/2024 96     Triglycerides (mg/dL)   Date Value   09/07/2024 423 (H)       8/5/2022  - Vitamin D21.0  - AMBER 1: 160  - CRP,, ESR rheumatoid factor normal      8/11/2022  -C3, C4, aldolase, CK within normal limits  - Antithyroglobulin, Antihistone antibody  and antithyroid peroxidase elevated  - Serine protease's IgG, MPO antibodies, HLA-B27 negative    Nuclear medicine uptake scan 11/4/2020  Impression   CONCLUSION: Abnormal thyroid study demonstrating an elevated RAIU of 39%.  Thyroid images demonstrate diffusely increased uptake of radiotracer bilaterally.  There is a suggestion of a cold nodule the lower pole of the left thyroid lobe; however, the   recent ultrasound demonstrates no nodules.  This is of uncertain significance.      2D echo 1/15/2022  Findings   Left ventricle:  The cavity size was normal. Wall thickness was   normal. Systolic function was normal. The estimated ejection   fraction was 70%, by 3D calcuation. Wall motion was normal; there   were no regional wall motion abnormalities. Left ventricular   diastolic function parameters were normal.   Aortic valve:   Structurally normal valve. Trileaflet; normal   thickened leaflets. Cusp separation was normal.  Doppler:   There   was no stenosis.    No regurgitation.   Aorta:  Aortic root: The aortic root was normal in size.   Mitral valve:   Normal thickened leaflets. Mobility was not   restricted.  Doppler:   There was no evidence for stenosis.    No   regurgitation.    Peak gradient (D): 3mm Hg.   Left atrium:  The atrium was normal in size.   Atrial septum:  The septum was normal.   Right ventricle:  The cavity size was normal. Systolic function was   normal.   Pulmonic valve:   Not well visualized.  The valve appears to be   grossly normal.    Doppler:   There was no evidence for stenosis.    Trivial regurgitation.   Tricuspid valve:   Structurally normal valve.   Mobility was not    restricted.  Doppler:   There was no evidence for stenosis.   Trivial regurgitation.   Pulmonary artery:   The main pulmonary artery was normal-sized.   Systolic pressure was within the normal range.   Right atrium:  The atrium was normal in size.   Pericardium:  There was no pericardial effusion.   Quality of study:  Image quality was fair.   Systemic veins:   Inferior vena cava: The vessel was normal in size. The   respirophasic diameter changes were in the normal range (= 50%),   consistent with normal central venous pressure.     Ultrasound Dopplers 11/28/2022  Impression   CONCLUSION: No evidence of significant internal carotid artery stenosis.          Colonoscopy 11/25/2019  Final Diagnosis:      Descending colon polyp:   Tubular adenoma.            ASSESSMENT/PLAN:     Left ankle ligament tear  Seen by Dr. Ruiz 8/14/2024, for which cleared for surgery with Dr. Mayen 8/21/2024.  - Did well with surgery, ongoing recovery may still take a few weeks-months from surgery   -Planning for physical therapy as directed by Dr. Mayen  -He has had some flareups of anxiety that we will manage episodically with alprazolam as needed.  Feels like he can improve this as his ambulation increases    GERD  -Seems to be stable  - Can use PPI therapy on an as-needed basis    Arthralgias  Family history of lupus  Established with rheumatology, Dr. Figueroa 10/27/2022-last clinic visit  -AMBER positive, noted positive TPO and thyroglobulin autoantibodies with history of Graves' disease  - Elevated antihistone antibodies  - Interval improvement with management per rheumatology, vitamin D repletion and diclofenac  - Advised target weight loss over time which may help with long-term pain management control     Elevated fasting glucose  - Most recent A1c 4.7, glucose borderline 108.  - Will continue targeting weight loss over time with good nutrition and exercise    Hyperlipidemia  Hypertriglyceridemia noted on last lipid  panel  -Hypertriglyceridemia has improved down to 206, LDL 96, HDL 40, TGs 423  - ASCVD: 3.3% - no statin warranted; will repeat lipid panel  -We will continue with good nutrition, exercise habits.  He was doing well up until the injury and we will try to bring this down over time  - Plan to repeat again early next year     Hyperthyroidism  Reported history of hypothyroidism requiring levothyroxine in the past.  Became symptomatic for hyperthyroidism and diagnosed with Graves' disease, follows endocrinology with Dr. Mae last seen 11/4/2021.  Discontinued methimazole 5 mg daily.  - Positive antibodies antithyroglobulin greater than 500, antithyroid peroxidase  greater 50  -Continue following with endocrinology, Dr. Mae, last seen 10/30/2023.  Disease is currently in remission.  Has follow-up visit coming up soon        Colon adenoma  Seen on colonoscopy 11/2019, repeat 2024.  -Continue following up with Dr. Nelson for repeat colonoscopy 2030     History of vertigo  Previous seen by neurology and ENT  -Clinically stable     Anxiety  Home medications: 0.25 mg alprazolam daily as needed  -Seems to be well controlled, refilled alprazolam.  We will increase medication slightly given his higher psychosocial stressors with work     Vitamin D deficiency  - Vitamin D level 21.0 in 8/2022, underwent repletion per rheumatology  -Last vitamin D within normal limits    Family history of prostate cancer  - We will check a PSA level on an annual basis       Orders This Visit:  Orders Placed This Encounter   Procedures    PSA (Screening) [E]       Meds This Visit:  Requested Prescriptions     Signed Prescriptions Disp Refills    ALPRAZolam (XANAX) 0.25 MG Oral Tab 30 tablet 1     Sig: Take 1 tablet (0.25 mg total) by mouth daily as needed for Anxiety.       Imaging & Referrals:  None       Health Maintenance  HTN Screen: BP at goal  DM Screen: Check fasting sugar  HLD Screen: Check fasting lipid panel  HCV Screen: Considered  low risk  HIV Screen: considered low risk  G/C/Syphilis: Considered low risk    Colon Cancer Screening (45-70): Up-to-date, due 2030 with Dr. Nelson  Prostate Cancer Screening: (50-70): Not indicated  Lung Cancer Screening (55-79 with 30 p/year and active < 15 years): Not indicated  AAA Screening (65-75 Hx of smoking): Not indicated    Influenza: Annually - hold off  Td/Tdap: Last Tdap: Within 10 years per patient  Zoster (60+): Not indicated  HPV (19-26): Not indicated  Pneumococcal: Not indicated    Immunization History   Administered Date(s) Administered    Covid-19 Vaccine Moderna 100 mcg/0.5 ml 04/24/2021, 05/22/2021         Return to clinic in 6 months for follow-up    Thierno Dee MD, 09/17/24, 5:05 PM       Consent: Written consent was obtained and risks were reviewed including but not limited to scarring, infection, bleeding, scabbing, incomplete removal, nerve damage and allergy to anesthesia.

## 2024-09-15 NOTE — PATIENT INSTRUCTIONS
- You were seen in clinic for regular annual check-up.  We did review your most recent set of blood work with overall improvement of your cholesterol.  However triglycerides remain elevated.  Glucose levels are also borderline high    No immediate medication we need to start at this time.  These levels likely have increased from the injury and decreased activity.  You had been doing a good job with maintaining exercise prior to this injury.  We will bring them down over time    Opted for more vegetables and fruits.  Try to cut down on animal products which can help improve cholesterol levels.  Proceed with good protein intake and good fiber intake    You may proceed with a PSA, prostate blood test.  We will need to repeat this once a year moving forward for prostate cancer screening guidelines    We are happy to hear that you have done well with surgery.  We will continue the physical therapy that was recommended starting tomorrow.  - We hope for speedy recovery for your rehab  - Continue following with Dr. Mayen    -Thyroid status seems to be stable, continue following up with endocrinology with Dr. Mae  -Please continue close surveillance with rheumatology, Dr. Figueroa  -Next due colonoscopy in 2030  -Please continue checking your blood pressures at home and notify us if they are increasing  - Vaccines may be due for: COVID dose #3, Tdap/tetanus shot  - Please continue to eat a varied diet including recommended servings of vegetables, fruits, and low fat dairy. Minimize high saturated fats (such as fast foods) and high sugar intake (such as soda)  - We recommend 150 minutes of moderate intensity exercise (brisk walking, swimming) weekly to maintain your current weight.  Targeted weight loss will require more vigorous exercise or more than 150 minutes/week.    Return to clinic in 6 months for follow-up.

## 2024-09-17 ENCOUNTER — OFFICE VISIT (OUTPATIENT)
Dept: INTERNAL MEDICINE CLINIC | Facility: CLINIC | Age: 49
End: 2024-09-17

## 2024-09-17 VITALS
TEMPERATURE: 98 F | WEIGHT: 220 LBS | OXYGEN SATURATION: 94 % | BODY MASS INDEX: 29.8 KG/M2 | HEIGHT: 72 IN | SYSTOLIC BLOOD PRESSURE: 122 MMHG | HEART RATE: 79 BPM | DIASTOLIC BLOOD PRESSURE: 82 MMHG

## 2024-09-17 DIAGNOSIS — E78.2 MIXED HYPERLIPIDEMIA: ICD-10-CM

## 2024-09-17 DIAGNOSIS — Z00.00 ANNUAL PHYSICAL EXAM: Primary | ICD-10-CM

## 2024-09-17 DIAGNOSIS — K21.9 GASTROESOPHAGEAL REFLUX DISEASE, UNSPECIFIED WHETHER ESOPHAGITIS PRESENT: ICD-10-CM

## 2024-09-17 DIAGNOSIS — R73.01 ELEVATED FASTING GLUCOSE: ICD-10-CM

## 2024-09-17 DIAGNOSIS — R76.8 POSITIVE ANA (ANTINUCLEAR ANTIBODY): ICD-10-CM

## 2024-09-17 DIAGNOSIS — E78.1 HYPERTRIGLYCERIDEMIA: ICD-10-CM

## 2024-09-17 DIAGNOSIS — F41.9 ANXIETY: ICD-10-CM

## 2024-09-17 DIAGNOSIS — Z80.42 FAMILY HISTORY OF PROSTATE CANCER: ICD-10-CM

## 2024-09-17 DIAGNOSIS — E05.00 GRAVES DISEASE: ICD-10-CM

## 2024-09-17 PROCEDURE — 99396 PREV VISIT EST AGE 40-64: CPT | Performed by: INTERNAL MEDICINE

## 2024-09-17 PROCEDURE — 3008F BODY MASS INDEX DOCD: CPT | Performed by: INTERNAL MEDICINE

## 2024-09-17 PROCEDURE — 3074F SYST BP LT 130 MM HG: CPT | Performed by: INTERNAL MEDICINE

## 2024-09-17 PROCEDURE — 3079F DIAST BP 80-89 MM HG: CPT | Performed by: INTERNAL MEDICINE

## 2024-09-17 RX ORDER — ALPRAZOLAM 0.25 MG
0.25 TABLET ORAL DAILY PRN
Qty: 30 TABLET | Refills: 1 | Status: SHIPPED | OUTPATIENT
Start: 2024-09-17

## 2024-09-28 ENCOUNTER — OFFICE VISIT (OUTPATIENT)
Dept: ENDOCRINOLOGY CLINIC | Facility: CLINIC | Age: 49
End: 2024-09-28

## 2024-09-28 VITALS
WEIGHT: 223 LBS | HEART RATE: 76 BPM | BODY MASS INDEX: 30.2 KG/M2 | HEIGHT: 72 IN | SYSTOLIC BLOOD PRESSURE: 138 MMHG | DIASTOLIC BLOOD PRESSURE: 90 MMHG

## 2024-09-28 DIAGNOSIS — E05.90 HYPERTHYROIDISM: Primary | ICD-10-CM

## 2024-09-28 PROCEDURE — 3080F DIAST BP >= 90 MM HG: CPT | Performed by: INTERNAL MEDICINE

## 2024-09-28 PROCEDURE — 3075F SYST BP GE 130 - 139MM HG: CPT | Performed by: INTERNAL MEDICINE

## 2024-09-28 PROCEDURE — 99213 OFFICE O/P EST LOW 20 MIN: CPT | Performed by: INTERNAL MEDICINE

## 2024-09-28 PROCEDURE — 3008F BODY MASS INDEX DOCD: CPT | Performed by: INTERNAL MEDICINE

## 2024-09-28 NOTE — PROGRESS NOTES
Name: Jeff Haney  Date: 9/28/2024    CC: Hyperthyroidism    HISTORY OF PRESENT ILLNESS   Jeff Haney is a 49 year old male who presents for hyperthyroidism.  He was previously maintained on levothyroxine therapy and dose has been significantly decreased over the past few months.  He is now off medication but subclinical hyperthyroidism is persistent.     Per patient he was previously maintained on Levothyroxine therapy for 10 years.   He was previously having significant palpitations but now improved.  In addition he was having mild insomnia which is improved.  No tremor.  Weight is stable.      Compression Symptoms:   Dysphagia: No  Dyspnea: No  Voice Change: No  Anterior Neck Pain: No    He was diagnosed with Graves Disease.  His I-131 scan confirmed diffuse uptake at 39% consistent with the disease.  He was treated with Methimazole and then entered remission.       9/2024   He is feeling well since last visit.  No palpitations.  No tremor.  Energy level stable.     REVIEW OF SYSTEMS  Eyes: no change in vision  Neurologic: no headache, generalized or focal weakness or numbness.  Head: normal  ENT: normal  Lungs: no shortness of breath, wheezing or SMALLS  Cardiovascular:  no chest pain or palpitations  Gastrointestinal:  no abdominal pain, bowel movement problems  Musculoskeletal: no muscle pain or arthralgia  /Gyne: no frequency or discomfort while urinating  Psychiatric:  no acute distress, anxiety  or depression  Skin: normal moisturized skin    Medications:     Current Outpatient Medications:     ALPRAZolam (XANAX) 0.25 MG Oral Tab, Take 1 tablet (0.25 mg total) by mouth daily as needed for Anxiety., Disp: 30 tablet, Rfl: 1     Allergies:   No Known Allergies    Social History:   Social History     Socioeconomic History    Marital status:    Tobacco Use    Smoking status: Never     Passive exposure: Current (co-workers 5 ft--outside)    Smokeless tobacco: Never   Vaping Use    Vaping status:  Never Used   Substance and Sexual Activity    Alcohol use: Yes     Alcohol/week: 1.0 standard drink of alcohol     Comment: on occ    Drug use: No   Other Topics Concern    Caffeine Concern Yes     Comment: Coffee       Medical History:   Past Medical History:    Anxiety    Colon adenoma    Disorder of thyroid    Essential hypertension    High cholesterol    Hyperlipidemia    Hyperthyroidism    Graves Disease    Hypothyroidism    Obesity    Pterygium    Vertigo    1 episode only       Surgical history:   Past Surgical History:   Procedure Laterality Date    Cholecystectomy  2006    Colonoscopy N/A 11/25/2019    Procedure: COLONOSCOPY;  Surgeon: Alexey Nelson MD;  Location: [facility] ENDOSCOPY    Colonoscopy  08/11/2023    Colonoscopy N/A 8/11/2023    Procedure: COLONOSCOPY;  Surgeon: Alexey Nelson MD;  Location: [facility] ENDO    Pterygium excision - od - right eye  around 2004 per patient    Removal gallbladder         PHYSICAL EXAM  /90   Pulse 76   Ht 6' (1.829 m)   Wt 223 lb (101.2 kg)   BMI 30.24 kg/m²     General Appearance:  alert, well developed, in no acute distress  Eyes:  normal conjunctivae, sclera., normal sclera and normal pupils  Throat/Neck: normal sound to voice.   Back: no kyphosis  Respiratory:  non-labored. no increased work of breathing.    Lymph Nodes:  No abnormal nodes noted  Skin:  normal moisture and skin texture  Hematologic:  no excessive bruising  Psychiatric:  oriented to time, self, and place      ASSESSMENT/PLAN:    1. Hyperthyroidism  - Discussed diagnosis of Graves Disease  - Discussed treatment for 12-18 months then evaluate for remission  - He has finished a year of therapy  - Disease is current in remission   - Recheck TSH, FT4, FT3 next year   - Contact clinic if any palpitations, tremor   - Thyroid US demonstrated inflammation, no nodules   - Further management based on above results    RTC 1 year     9/28/2024  Linette Mae MD

## 2024-10-05 ENCOUNTER — LAB ENCOUNTER (OUTPATIENT)
Dept: LAB | Age: 49
End: 2024-10-05
Attending: INTERNAL MEDICINE
Payer: COMMERCIAL

## 2024-10-05 DIAGNOSIS — Z80.42 FAMILY HISTORY OF PROSTATE CANCER: ICD-10-CM

## 2024-10-05 LAB — COMPLEXED PSA SERPL-MCNC: 2.09 NG/ML (ref ?–4)

## 2024-10-05 PROCEDURE — 36415 COLL VENOUS BLD VENIPUNCTURE: CPT

## 2024-10-09 ENCOUNTER — TELEPHONE (OUTPATIENT)
Dept: INTERNAL MEDICINE CLINIC | Facility: CLINIC | Age: 49
End: 2024-10-09

## 2024-11-09 ENCOUNTER — TELEPHONE (OUTPATIENT)
Dept: INTERNAL MEDICINE CLINIC | Facility: CLINIC | Age: 49
End: 2024-11-09

## 2024-11-18 NOTE — TELEPHONE ENCOUNTER
Outside records from 10/27  - CMP: Within normal limits  - Triglycerides 408, total cholesterol 211, LDL in range but borderline high  - A1c 5.1%, prostate, thyroid    MyChart message sent.

## 2024-12-14 ENCOUNTER — TELEPHONE (OUTPATIENT)
Dept: INTERNAL MEDICINE CLINIC | Facility: CLINIC | Age: 49
End: 2024-12-14

## 2024-12-14 NOTE — TELEPHONE ENCOUNTER
[place] orthopedic specialist for left ankle sprain follow-up 12/12/2024 Dr. BEST haro      - Postoperative visit status post debridement and ligament repair 8/21/2024.  Mobic as needed.  Able to return to work.  Follow-up again in 6 weeks

## 2025-03-16 ENCOUNTER — LAB ENCOUNTER (OUTPATIENT)
Dept: LAB | Facility: HOSPITAL | Age: 50
End: 2025-03-16
Attending: INTERNAL MEDICINE
Payer: COMMERCIAL

## 2025-03-16 DIAGNOSIS — E05.00 GRAVES DISEASE: ICD-10-CM

## 2025-03-16 DIAGNOSIS — E05.90 HYPERTHYROIDISM: ICD-10-CM

## 2025-03-16 LAB
T3FREE SERPL-MCNC: 3.34 PG/ML (ref 2.4–4.2)
T4 FREE SERPL-MCNC: 1.2 NG/DL (ref 0.8–1.7)
TSI SER-ACNC: 2.58 UIU/ML (ref 0.55–4.78)

## 2025-03-16 PROCEDURE — 36415 COLL VENOUS BLD VENIPUNCTURE: CPT

## 2025-03-16 PROCEDURE — 84481 FREE ASSAY (FT-3): CPT

## 2025-03-16 PROCEDURE — 84443 ASSAY THYROID STIM HORMONE: CPT

## 2025-03-16 PROCEDURE — 84439 ASSAY OF FREE THYROXINE: CPT

## 2025-03-17 ENCOUNTER — TELEPHONE (OUTPATIENT)
Dept: INTERNAL MEDICINE CLINIC | Facility: CLINIC | Age: 50
End: 2025-03-17

## 2025-03-17 NOTE — TELEPHONE ENCOUNTER
I sent a [facility] message, thankfully thyroid tests are normal.  Should send us a [facility] [employer] message in about 2 weeks and how things are going

## 2025-03-21 ENCOUNTER — TELEPHONE (OUTPATIENT)
Age: 50
End: 2025-03-21

## 2025-03-21 NOTE — TELEPHONE ENCOUNTER
I am reaching out from the [facility] Navigation department, following up on an order from your provider's office to assist in connecting you with resources for care. If you would like to discuss this further, please give us a call back at [phone], or for more immediate assistance you can contact our 24-hour help line at [phone]. We look forward to hearing from you soon.

## 2025-04-02 ENCOUNTER — TELEPHONE (OUTPATIENT)
Age: 50
End: 2025-04-02

## 2025-04-02 NOTE — TELEPHONE ENCOUNTER
Hello,  Sorry I missed you - I am reaching out from the [facility] Navigation department, following up on an order from your provider's office to assist in connecting you with resources for care. If you would like to discuss this further, please give us a call back at [phone], or for more immediate assistance you can contact our 24-hour help line at [phone] We look forward to hearing from you soon.

## 2025-05-12 ENCOUNTER — HOSPITAL ENCOUNTER (OUTPATIENT)
Age: 50
Discharge: HOME | End: 2025-05-12
Payer: COMMERCIAL

## 2025-05-12 ENCOUNTER — APPOINTMENT (OUTPATIENT)
Dept: CT IMAGING | Facility: HOSPITAL | Age: 50
End: 2025-05-12
Attending: PHYSICIAN ASSISTANT
Payer: COMMERCIAL

## 2025-05-12 VITALS
OXYGEN SATURATION: 99 % | TEMPERATURE: 98 F | HEART RATE: 68 BPM | DIASTOLIC BLOOD PRESSURE: 88 MMHG | SYSTOLIC BLOOD PRESSURE: 140 MMHG | RESPIRATION RATE: 18 BRPM

## 2025-05-12 DIAGNOSIS — R79.89 ELEVATED SERUM CREATININE: ICD-10-CM

## 2025-05-12 DIAGNOSIS — R10.30 LOWER ABDOMINAL PAIN: ICD-10-CM

## 2025-05-12 DIAGNOSIS — R10.9 RIGHT FLANK PAIN: Primary | ICD-10-CM

## 2025-05-12 DIAGNOSIS — R79.9 ELEVATED BUN: ICD-10-CM

## 2025-05-12 DIAGNOSIS — K57.10 DUODENAL DIVERTICULUM: ICD-10-CM

## 2025-05-12 LAB
#MXD IC: 0.4 X10ˆ3/UL (ref 0.1–1)
BILIRUB UR QL STRIP: NEGATIVE
BUN BLD-MCNC: 24 MG/DL (ref 7–18)
CHLORIDE BLD-SCNC: 101 MMOL/L (ref 98–112)
CLARITY UR: CLEAR
CO2 BLD-SCNC: 26 MMOL/L (ref 21–32)
COLOR UR: YELLOW
CREAT BLD-MCNC: 1.4 MG/DL (ref 0.7–1.3)
EGFRCR SERPLBLD CKD-EPI 2021: 62 ML/MIN/1.73M2 (ref 60–?)
GLUCOSE BLD-MCNC: 92 MG/DL (ref 70–99)
GLUCOSE UR STRIP-MCNC: NEGATIVE MG/DL
HCT VFR BLD AUTO: 40.2 % (ref 39–53)
HCT VFR BLD CALC: 45 % (ref 37–53)
HGB BLD-MCNC: 14.1 G/DL (ref 13–17.5)
HGB UR QL STRIP: NEGATIVE
ISTAT IONIZED CALCIUM FOR CHEM 8: 1.27 MMOL/L (ref 1.12–1.32)
KETONES UR STRIP-MCNC: NEGATIVE MG/DL
LEUKOCYTE ESTERASE UR QL STRIP: NEGATIVE
LYMPHOCYTES # BLD AUTO: 2.6 X10ˆ3/UL (ref 1–4)
LYMPHOCYTES NFR BLD AUTO: 37.4 %
MCH RBC QN AUTO: 31.6 PG (ref 26–34)
MCHC RBC AUTO-ENTMCNC: 35.1 G/DL (ref 31–37)
MCV RBC AUTO: 90.1 FL (ref 80–100)
MIXED CELL %: 6.3 %
NEUTROPHILS # BLD AUTO: 4 X10ˆ3/UL (ref 1.5–7.7)
NEUTROPHILS NFR BLD AUTO: 56.3 %
NITRITE UR QL STRIP: NEGATIVE
PH UR STRIP: 6 [PH]
PLATELET # BLD AUTO: 320 X10ˆ3/UL (ref 150–450)
POTASSIUM BLD-SCNC: 4.3 MMOL/L (ref 3.6–5.1)
PROT UR STRIP-MCNC: NEGATIVE MG/DL
RBC # BLD AUTO: 4.46 X10ˆ6/UL (ref 4.3–5.7)
SODIUM BLD-SCNC: 138 MMOL/L (ref 136–145)
SP GR UR STRIP: 1.02
UROBILINOGEN UR STRIP-ACNC: <2 MG/DL
WBC # BLD AUTO: 7 X10ˆ3/UL (ref 4–11)

## 2025-05-12 PROCEDURE — 74176 CT ABD & PELVIS W/O CONTRAST: CPT | Performed by: PHYSICIAN ASSISTANT

## 2025-05-12 PROCEDURE — 99204 OFFICE O/P NEW MOD 45 MIN: CPT | Performed by: PHYSICIAN ASSISTANT

## 2025-05-12 PROCEDURE — 85025 COMPLETE CBC W/AUTO DIFF WBC: CPT | Performed by: PHYSICIAN ASSISTANT

## 2025-05-12 PROCEDURE — 81002 URINALYSIS NONAUTO W/O SCOPE: CPT | Performed by: PHYSICIAN ASSISTANT

## 2025-05-12 PROCEDURE — 87086 URINE CULTURE/COLONY COUNT: CPT | Performed by: PHYSICIAN ASSISTANT

## 2025-05-12 PROCEDURE — 80047 BASIC METABLC PNL IONIZED CA: CPT | Performed by: PHYSICIAN ASSISTANT

## 2025-05-12 RX ORDER — CYCLOBENZAPRINE HCL 10 MG
10 TABLET ORAL 3 TIMES DAILY PRN
Qty: 14 TABLET | Refills: 0 | Status: SHIPPED | OUTPATIENT
Start: 2025-05-12 | End: 2025-05-19

## 2025-05-12 RX ORDER — LIDOCAINE 50 MG/G
1 PATCH TOPICAL EVERY 24 HOURS
Qty: 5 PATCH | Refills: 0 | Status: SHIPPED | OUTPATIENT
Start: 2025-05-12 | End: 2025-05-17

## 2025-05-12 NOTE — ED PROVIDER NOTES
Patient Seen in: [facility]    History     Chief Complaint   Patient presents with    Abdominal Pain     Lower back pain radiating to the front of stomach. - Entered by patient     Stated Complaint: Abdominal Pain - Lower back pain radiating to the front of stomach.    HPI    Jeff Haney is a 50 year old male who presents with chief complaint of right flank pain.  Onset 4 days ago.  Patient states pain now radiates to the right lower quadrant and left lower quadrant of his abdomen.  Patient reports associated dysuria.  Patient denies fever, chills, nausea, vomiting, diarrhea, constipation, melena, hematochezia, hematuria, scrotal pain, scrotal swelling, scrotal redness, saddle anesthesia, bowel/bladder incontinence, weakness, paresthesias.        Past Medical History[1]    Past Surgical History[2]         Family History[3]    Short Social Hx on File[4]    Review of Systems    Positive for stated complaint: Abdominal Pain - Lower back pain radiating to the front of stomach.  Other systems are as noted in HPI.  Constitutional and vital signs reviewed.      All other systems reviewed and negative except as noted above.    PSFH elements reviewed from today and agreed except as otherwise stated in HPI.    Physical Exam     ED Triage Vitals [05/12/25 1634]   /88   Pulse 68   Resp 18   Temp 97.8 °F (36.6 °C)   Temp src Oral   SpO2 99 %   O2 Device None (Room air)       Current:/88   Pulse 68   Temp 97.8 °F (36.6 °C) (Oral)   Resp 18   SpO2 99%     PULSE OX within normal limits on room air as interpreted by this provider.        Physical Exam    Constitutional: The patient is cooperative. Appears well-developed and well-nourished.  Mild discomfort.  Psychological: Alert, No abnormalities of mood, affect.  Head: Normocephalic/atraumatic.  Eyes: Pupils are equal round reactive to light.  Conjunctiva are within normal limits.  ENT: Oropharynx is clear.  Mucous membranes moist.  Neck: The neck  is supple.  No meningeal signs.  Chest: There is no tenderness to the chest wall.  No CVA tenderness bilaterally.  Respiratory: Respiratory effort was normal.  There is no rales, wheezes, or rhonchi.  There is no stridor.  Air entry is equal.  Cardiovascular: Regular rate and rhythm.  Capillary refill is brisk.   Gastrointestinal: Positive tenderness to palpation present at right lower quadrant and left lower quadrant.  Abdomen soft, nondistended.  There is no rebound tenderness or guarding.  No organomegaly is noted. No peritoneal signs.  Normal bowel sounds.  Genitourinary: Not examined.  Lymphatic: No gross lymphadenopathy noted.  Musculoskeletal: Musculoskeletal system is grossly intact.  There is no obvious deformity.  Thoracic and lumbar spine nontender to palpation.  Neurological: Gross motor movement is intact in all 4 extremities.  Patient exhibits normal speech.  Skin: Skin is normal to inspection.  Warm and dry.  No obvious bruising.  No obvious rash.          ED Course     Labs Reviewed   POCT ISTAT CHEM8 CARTRIDGE - Abnormal; Notable for the following components:       Result Value    ISTAT BUN 24 (*)     ISTAT Creatinine 1.40 (*)     All other components within normal limits   POCT CBC   [facility] POCT URINALYSIS DIPSTICK   URINE CULTURE, ROUTINE       MDM             Radiology Interpretation:  CT ABDOMEN+PELVIS KIDNEYSTONE 2D RNDR(NO IV,NO ORAL)(CPT=74176)  Result Date: 5/12/2025  CONCLUSION:  1.  5 cm diameter periampullary duodenal diverticulum.  Post cholecystectomy.  No significant biliary ductal dilatation. 2.  Small fat containing left inguinal hernia. 3.  Tiny fat containing umbilical hernia. 4.  Urinary bladder is incompletely distended.  Wall thickening the bladder which could be due to incomplete distention or a nonspecific cystitis.    Dictated by (CST): Frederick Darby MD on 5/12/2025 at 6:08 PM     Finalized by (CST): Frederick Darby MD on 5/12/2025 at 6:12 PM            Medical Decision  Making  Differential diagnosis prior to work-up including but not limited to biliary colic, pancreatitis, gastritis, enteritis, colitis, diverticulitis, appendicitis, perforated viscus, bowel obstruction, UTI, urolithiasis    Problems Addressed:  Duodenal diverticulum: acute illness or injury  Elevated BUN: acute illness or injury  Elevated serum creatinine: acute illness or injury  Lower abdominal pain: acute illness or injury  Right flank pain: acute illness or injury    Amount and/or Complexity of Data Reviewed  Labs: ordered. Decision-making details documented in ED Course.     Details: Elevated creatinine and elevated BUN noted.  Radiology: ordered. Decision-making details documented in ED Course.    Risk  Prescription drug management.      Physical exam remained stable as previously documented.  Available results reviewed with patient.  Discussed with patient findings of CT abdomen and pelvis.    I have given the patient instructions regarding their diagnoses, expectations, follow up, and ER precautions. I explained to the patient that emergent conditions may arise and to go to the ER for new, worsening or any persistent conditions. I've explained the importance of following up with their doctor as instructed. The patient verbalized understanding of the discharge instructions and plan.    Patient case discussed with Dr. Jacobson.    Discussed patient case with Dr. Darby.  Recommend follow-up with gastroenterology.    Disposition and Plan     Clinical Impression:  1. Right flank pain    2. Lower abdominal pain    3. Duodenal diverticulum    4. Elevated BUN    5. Elevated serum creatinine        Disposition:  Discharge    Follow-up:  Thierno Dee MD  [address] [facility] [address]  [phone]    Call in 1 day  For follow-up    Jordan Mcneal MD  [address] [address] [phone]    Call in 1 day  For follow-up      Medications Prescribed:  Discharge Medication  List as of 2025  6:32 PM        START taking these medications    Details   cyclobenzaprine 10 MG Oral Tab Take 1 tablet (10 mg total) by mouth 3 (three) times daily as needed for Muscle spasms., Normal, Disp-14 tablet, R-0      lidocaine 5 % External Patch Place 1 patch onto the skin daily for 5 days., Normal, Disp-5 patch, R-0                            [1]   Past Medical History:   Anxiety    Colon adenoma    Disorder of thyroid    Essential hypertension    High cholesterol    Hyperlipidemia    Hyperthyroidism    Graves Disease    Hypothyroidism    Obesity    Pterygium    Vertigo    1 episode only   [2]   Past Surgical History:  Procedure Laterality Date    Cholecystectomy      Colonoscopy N/A 2019    Procedure: COLONOSCOPY;  Surgeon: Alexey Nelson MD;  Location: [facility] ENDOSCOPY    Colonoscopy  2023    Colonoscopy N/A 2023    Procedure: COLONOSCOPY;  Surgeon: Alexey Nelson MD;  [facility] ENDO    Pterygium excision - od - right eye  around  per patient    Removal gallbladder     [3]   Family History  Problem Relation Age of Onset    Other (systemic lupus) Mother          age 43    Diabetes Brother 30        type 2    Hypertension Father     Thyroid disease Other         sister, 2 brothers    Heart Disease Brother 35        unknown heart disease    Cancer Paternal Uncle         , lung, smoker    Diabetes Paternal Aunt     Cancer Maternal Aunt         breast, older age   [4]   Social History  Socioeconomic History    Marital status:    Tobacco Use    Smoking status: Never     Passive exposure: Current (co-workers 5 ft--outside)    Smokeless tobacco: Never   Vaping Use    Vaping status: Never Used   Substance and Sexual Activity    Alcohol use: Yes     Alcohol/week: 1.0 standard drink of alcohol     Comment: on occ    Drug use: No   Other Topics Concern    Caffeine Concern Yes     Comment: Coffee

## 2025-05-12 NOTE — ED INITIAL ASSESSMENT (HPI)
Pt complaining of right sided back pain that started 4 days ago.  Pt is now having pain in the lower area for a couple of days. +pain with urination.

## 2025-06-26 ENCOUNTER — TELEPHONE (OUTPATIENT)
Dept: INTERNAL MEDICINE CLINIC | Facility: CLINIC | Age: 50
End: 2025-06-26

## 2025-06-26 NOTE — TELEPHONE ENCOUNTER
Please notify patient I reviewed the labs that were collected 6/24  - The cholesterol levels were very high with triglycerides 557, total cholesterol borderline high 205  - All other labs look good including prostate level and normal A1c.    The triglycerides are considered moderate elevation.  I am hesitant to start any medication as we are departing, and he will need to have this monitored in the primary care setting with a repeat blood test in 3 months.  He needs to target weight loss, incorporate a low-fat diet such as the Mediterranean diet.  He could consider taking omega-3 fatty acid supplements starting at 1 capsule twice a day.  However he follows up with after our clinic closure, I advised a 3-month follow-up with a cholesterol blood test.  If still high, he might need to be started on medications

## 2025-06-26 NOTE — TELEPHONE ENCOUNTER
Spoke to patient wife(ok per HIPAA) relayed MD message. Verbalized understanding. Message sent via [employer] as well per patient wife request.

## 2025-09-29 ENCOUNTER — APPOINTMENT (OUTPATIENT)
Dept: INTERNAL MEDICINE | Age: 50
End: 2025-09-29

## (undated) DIAGNOSIS — E05.90 HYPERTHYROIDISM: Primary | ICD-10-CM

## (undated) DEVICE — LINE MNTR ADLT SET O2 INTMD

## (undated) DEVICE — 60 ML SYRINGE REGULAR TIP: Brand: MONOJECT

## (undated) DEVICE — KIT CLEAN ENDOKIT 1.1OZ GOWNX2

## (undated) DEVICE — 35 ML SYRINGE REGULAR TIP: Brand: MONOJECT

## (undated) DEVICE — KIT ENDO ORCAPOD 160/180/190

## (undated) DEVICE — Device: Brand: CUSTOM PROCEDURE KIT

## (undated) DEVICE — Device: Brand: DEFENDO AIR/WATER/SUCTION AND BIOPSY VALVE

## (undated) NOTE — LETTER
[address] [facility] [address] [place], [place]  Authorization for Surgical Operation and Procedure                                                                                           I hereby authorize Ike Lee MD, my physician and his/her assistants (if applicable), which may include medical students, residents, and/or fellows, to perform the following surgical operation/ procedure and administer such anesthesia as may be determined necessary by my physician: Operation/Procedure name (s) COLONOSCOPY on Chel Wiley   2. I recognize that during the surgical operation/procedure, unforeseen conditions may necessitate additional or different procedures than those listed above. I, therefore, further authorize and request that the above-named surgeon, assistants, or designees perform such procedures as are, in their judgment, necessary and desirable. 3.   My surgeon/physician has discussed prior to my surgery the potential benefits, risks and side effects of this procedure; the likelihood of achieving goals; and potential problems that might occur during recuperation. They also discussed reasonable alternatives to the procedure, including risks, benefits, and side effects related to the alternatives and risks related to not receiving this procedure. I have had all my questions answered and I acknowledge that no guarantee has been made as to the result that may be obtained. 4.   Should the need arise during my operation/procedure, which includes change of level of care prior to discharge, I also consent to the administration of blood and/or blood products. Further, I understand that despite careful testing and screening of blood or blood products by collecting agencies, I may still be subject to ill effects as a result of receiving a blood transfusion and/or blood products.   The following are some, but not all, of the potential risks that can occur: fever and allergic reactions, hemolytic reactions, transmission of diseases such as Hepatitis, AIDS and Cytomegalovirus (CMV) and fluid overload. In the event that I wish to have an autologous transfusion of my own blood, or a directed donor transfusion, I will discuss this with my physician. Check only if Refusing Blood or Blood Products  I understand refusal of blood or blood products as deemed necessary by my physician may have serious consequences to my condition to include possible death. I hereby assume responsibility for my refusal and release the hospital, its personnel, and my physicians from any responsibility for the consequences of my refusal.    o  Refuse   5. I authorize the use of any specimen, organs, tissues, body parts or foreign objects that may be removed from my body during the operation/procedure for diagnosis, research or teaching purposes and their subsequent disposal by hospital authorities. I also authorize the release of specimen test results and/or written reports to my treating physician on the hospital medical staff or other referring or consulting physicians involved in my care, at the discretion of the Pathologist or my treating physician. 6.   I consent to the photographing or videotaping of the operations or procedures to be performed, including appropriate portions of my body for medical, scientific, or educational purposes, provided my identity is not revealed by the pictures or by descriptive texts accompanying them. If the procedure has been photographed/videotaped, the surgeon will obtain the original picture, image, videotape or CD. The hospital will not be responsible for storage, release or maintenance of the picture, image, tape or CD.    7.   I consent to the presence of a  or observers in the operating room as deemed necessary by my physician or their designees.     8.   I recognize that in the event my procedure results in extended X-Ray/fluoroscopy time, I may develop a skin reaction. 9. If I have a Do Not Attempt Resuscitation (DNAR) order in place, that status will be suspended while in the operating room, procedural suite, and during the recovery period unless otherwise explicitly stated by me (or a person authorized to consent on my behalf). The surgeon or my attending physician will determine when the applicable recovery period ends for purposes of reinstating the DNAR order. 10. Patients having a sterilization procedure: I understand that if the procedure is successful the results will be permanent and it will therefore be impossible for me to inseminate, conceive, or bear children. I also understand that the procedure is intended to result in sterility, although the result has not been guaranteed. 11. I acknowledge that my physician has explained sedation/analgesia administration to me including the risk and benefits I consent to the administration of sedation/analgesia as may be necessary or desirable in the judgment of my physician. I CERTIFY THAT I HAVE READ AND FULLY UNDERSTAND THE ABOVE CONSENT TO OPERATION and/or OTHER PROCEDURE.     _________________________________________ _________________________________     ___________________________________  Signature of Patient     Signature of Responsible Person                   Printed Name of Responsible Person                              _________________________________________ ______________________________        ___________________________________  Signature of Witness         Date  Time         Relationship to Patient    STATEMENT OF PHYSICIAN My signature below affirms that prior to the time of the procedure; I have explained to the patient and/or his/her legal representative, the risks and benefits involved in the proposed treatment and any reasonable alternative to the proposed treatment.  I have also explained the risks and benefits involved in refusal of the proposed treatment and alternatives to the proposed treatment and have answered the patient's questions.  If I have a significant financial interest in a co-management agreement or a significant financial interest in any product or implant, or other significant relationship used in this procedure/surgery, I have disclosed this and had a discussion with my patient.     _______________________________________________________________ _____________________________  Vida Berry Physician)                                                                                         (Date)                                   (Time)  Patient Name: Coleen Dewitt    : 1975   Printed: 8/10/2023      Medical Record #: [id number]                                              Page 1 of 1

## (undated) NOTE — LETTER
Date & Time: 12/7/2021, 6:11 PM  Patient: Annabelle Guerrero  Encounter Provider(s):    On File, E ALFRED Attending  Reji Leach MD       To Whom It May Concern: Annabelle Guerrero was seen and treated in our department on 12/7/2021.  He should not return to w

## (undated) NOTE — MR AVS SNAPSHOT
[facility] [address] [facility] [address]  [phone]               Thank you for choosing us for your health care visit with Tyra Alegre DO. We are glad to serve you and happy to provide you with this summary of your visit.   Please he Take 1,000 mg by mouth 3 (three) times daily.    Commonly known as:  FISH OIL           VASCEPA 1 G Caps   Generic drug:  Icosapent Ethyl   TAKE 2 CAPSULES BY MOUTH  DAILY                Where to Get Your Medications      These medications were sent to [facility] Dietary sodium reduction Reduce dietary sodium intake to <= 100 mmol per day (2.4 g sodium or 6 g sodium chloride)   Aerobic physical activity Regular aerobic physical activity (e.g., brisk walking, light jogging, cycling, swimming, etc.) for a goal of at

## (undated) NOTE — MR AVS SNAPSHOT
[facility]  [address] [place] [address]  [phone]               Thank you for choosing us for your health care visit with Erna Houston DO. We are glad to serve you and happy to provide you with this summary of your visit.   Please he Commonly known as:  FISH OIL           VASCEPA 1 g Caps   Generic drug:  Icosapent Ethyl   TAKE 2 CAPSULES BY MOUTH  DAILY           * Notice: This list has 2 medication(s) that are the same as other medications prescribed for you.  Read the directions car

## (undated) NOTE — LETTER
Date & Time: 12/7/2020, 3:31 AM  Patient: Edilma Bonilla  Encounter Provider(s):    Sarah Delcid MD       To Whom It May Concern: Edilma Bonilla was seen and treated in our department on 12/7/2020. He may return to work 12/8/2020.     If you have a

## (undated) NOTE — LETTER
08/19/23    [address]  [place] [address]      Dear Indiana Mac,    [address] records indicate that you have outstanding lab work and or testing that was ordered for you and has not yet been completed:  Orders Placed This Encounter      Histone DNA    To provide you with the best possible care, please complete these orders at your earliest convenience. If you have recently completed these orders please disregard this letter. If you have any questions please call the office at Dept: [phone].      Thank you,     Filemon Arias MD

## (undated) NOTE — LETTER
To Whom It May Concern: This certifies that Jalyn Hanna was seen in the office for treatment of a medical condition. Please excuse him from work on Monday, 7/29/19.      Sincerely,        DAVID Bhatt

## (undated) NOTE — LETTER
MARSHA Ko  [address]  [address] [facility] [address] [address]  [phone]                11/26/19      Yuliana Mora  [facility] [place] [address]    Dear Denisse Alaniz,    I reviewed the pathology report from t

## (undated) NOTE — LETTER
Date & Time: 2/17/2019, 3:07 PM  Patient: Azam Aponte  Encounter Provider(s):    eDbbi Corral MD       To Whom It May Concern:    Azam Aponte was seen and treated in our department on 2/17/2019.  He may should not return to work until 2/19/20

## (undated) NOTE — ED AVS SNAPSHOT
Jayla Isaac   MRN: [id number]    Department:  [facility] Emergency Department   Date of Visit:  2/17/2019           Disclosure     Insurance plans vary and the physician(s) referred by the ER may not be covered by your plan.  Please contact CARE PHYSICIAN AT ONCE OR RETURN IMMEDIATELY TO THE EMERGENCY DEPARTMENT. If you have been prescribed any medication(s), please fill your prescription right away and begin taking the medication(s) as directed.   If you believe that any of the medications